# Patient Record
Sex: FEMALE | Race: WHITE | NOT HISPANIC OR LATINO | Employment: FULL TIME | URBAN - METROPOLITAN AREA
[De-identification: names, ages, dates, MRNs, and addresses within clinical notes are randomized per-mention and may not be internally consistent; named-entity substitution may affect disease eponyms.]

---

## 2024-11-15 ENCOUNTER — TELEPHONE (OUTPATIENT)
Age: 39
End: 2024-11-15

## 2024-11-15 NOTE — TELEPHONE ENCOUNTER
Spoke to pt, has no concerns other then her age and this is her 1st pregnancy. Advice pt to wait for her schedule apt and if she has any issues prior to her apt then she can give us a call back.

## 2024-11-15 NOTE — TELEPHONE ENCOUNTER
Pt called, requesting a call from provider if she will need to be seen sooner due to her age and first time pregnancy.     Pt is requesting a call from ofc to help her decide on insurance benefits for her OB visits. Advised to pt to reach out to insurance, pt stated that she will, but still requests a call from ofc.

## 2024-11-21 ENCOUNTER — TELEPHONE (OUTPATIENT)
Age: 39
End: 2024-11-21

## 2024-11-21 DIAGNOSIS — E07.9 THYROID DISEASE DURING PREGNANCY IN FIRST TRIMESTER: Primary | ICD-10-CM

## 2024-11-21 DIAGNOSIS — O99.281 THYROID DISEASE DURING PREGNANCY IN FIRST TRIMESTER: Primary | ICD-10-CM

## 2024-11-21 NOTE — TELEPHONE ENCOUNTER
New OB patient, LMP 10/11/24 (5w6d) calling with concerns for thyroid levels being off. Had previous levels prior to pregnancy checked by old PCP in March 2024 which returned at - TSH 5.17 and T4 1.2. Was supposed to return and obtain repeat levels within 3 months however, patient had not obtained those labs due to having bought a house and relocated. Now patient noted that the last 3 weeks, she has had significant increase in hair loss and cold intolerance. Patient's immediate family members (parents, siblings) all have thyroid disease which is managed by medication and she believes her symptoms may be related to her thyroid. She is concerned her thyroid dysfunction will affect pregnancy. Asking if possible to check levels prior to D&V (scheduled 12/10/24) for further evaluation. Pt plans to establish care with new PCP within the [facility] network as well, and aware will follow up with PCP for thyroid management after delivery. RN advised will send message to provider for further recommendations. Pt agreeable to plan. Aware staff will return a call.   
RN placed call to patient with update on labs. Advised can go to any [facility]'s lab to obtain lab work. Once results are in, staff will have provider review, and call with recommendations. Pt also asking about caffeine intake. RN advised can still have caffeine but with moderation. Advised 1 cup of coffee OR soda per day is safe in pregnancy. Pt verbalized an understanding. No further questions.   
Yes can check TSH and fT4 at this time  
- - -

## 2024-11-22 ENCOUNTER — APPOINTMENT (OUTPATIENT)
Dept: LAB | Facility: CLINIC | Age: 39
End: 2024-11-22
Payer: COMMERCIAL

## 2024-11-22 DIAGNOSIS — O99.281 THYROID DISEASE DURING PREGNANCY IN FIRST TRIMESTER: ICD-10-CM

## 2024-11-22 DIAGNOSIS — E07.9 THYROID DISEASE DURING PREGNANCY IN FIRST TRIMESTER: ICD-10-CM

## 2024-11-22 LAB
T4 FREE SERPL-MCNC: 0.84 NG/DL (ref 0.61–1.12)
TSH SERPL DL<=0.05 MIU/L-ACNC: 6.1 UIU/ML (ref 0.45–4.5)

## 2024-11-22 PROCEDURE — 84443 ASSAY THYROID STIM HORMONE: CPT

## 2024-11-22 PROCEDURE — 84439 ASSAY OF FREE THYROXINE: CPT

## 2024-11-22 PROCEDURE — 36415 COLL VENOUS BLD VENIPUNCTURE: CPT

## 2024-11-23 ENCOUNTER — NURSE TRIAGE (OUTPATIENT)
Dept: OTHER | Facility: OTHER | Age: 39
End: 2024-11-23

## 2024-11-23 DIAGNOSIS — R79.89 ELEVATED TSH: Primary | ICD-10-CM

## 2024-11-23 PROBLEM — E03.8 SUBCLINICAL HYPOTHYROIDISM: Status: ACTIVE | Noted: 2024-11-23

## 2024-11-23 NOTE — TELEPHONE ENCOUNTER
"Regarding: Thyroid labs  ----- Message from Teresa GARCIA sent at 11/23/2024  2:26 PM EST -----  Pt stated, \" I spoke with someone earlier about my labs for my Thyroid levels being high. The doctor was supposed to call me back but I never received a call. \"    "

## 2024-11-23 NOTE — TELEPHONE ENCOUNTER
Reason for Disposition   [1] Follow-up call from patient regarding patient's clinical status AND [2] information urgent    Protocols used: PCP Call - No Triage-Adult-      ESC message sent to on call. Provider stated she tried calling patient earlier. She will try to call her again now.

## 2024-11-23 NOTE — TELEPHONE ENCOUNTER
"Regarding: bloodwork/thyroid  ----- Message from Lisa WHALEN sent at 11/23/2024 12:24 PM EST -----  \"I went for bloodwork and it came back really high for my throid. I wanted to know if I had to wait until Monday or can they put me on medication now\"    "

## 2024-11-23 NOTE — TELEPHONE ENCOUNTER
"Spoke with Dr. Nichelle Gomez, she will call patient directly. Patient made aware and verbalized understanding. Advised to call back if does not hear from MD.       Reason for Disposition   Prescription request for new medicine (not a refill)    Answer Assessment - Initial Assessment Questions  1. NAME of MEDICINE: \"What medicine(s) are you calling about?\"      Medication for thyroid  2. QUESTION: \"What is your question?\" (e.g., double dose of medicine, side effect)      \"Had blood work done for thyroid, results came back at 6.1. Concerned if be starting medication now because of pregnancy?\"  5. PREGNANCY:  \"Is there any chance that you are pregnant?\" \"When was your last menstrual period?\"      LMP 10.11.24 . Tested positive on 11.11.24. Has not yet been seen by OB. First appointment scheduled for 12.10.24. However, did have thyroid blood work ordered and done through us. Strong family history.    Protocols used: Medication Question Call-Adult-    "

## 2024-11-23 NOTE — PROGRESS NOTES
Patient called due to elevated TSH. Labs reviewed- TSH elevated to 6.1 with normal T4 of 0.84. Patient had pos UPT 11/11, LMP 10/11, 6w1d by dates. Reports fam hx of thyroid disease. Had labs drawn prior to pregnancy and had TSH of 5 with T4 1.8. Plan had been to repeat in 3-4 months but patient was lost to follow up with that provider. Called OB to complete testing when she realized she was pregnant.     Discussed with patient that normal T4 is reassuring. Discussed with patient thyroid replacement could be beneficial if she is found to have thyroid antibodies- otherwise treatment would only be indicated for overt hypothyroidism, which she does not have given normal T4. TSI and TPO abs ordered. If normal, plan to repeat labs in 4-6 weeks to monitor

## 2024-11-25 ENCOUNTER — APPOINTMENT (OUTPATIENT)
Dept: LAB | Facility: CLINIC | Age: 39
End: 2024-11-25
Payer: COMMERCIAL

## 2024-11-25 DIAGNOSIS — R79.89 ELEVATED TSH: ICD-10-CM

## 2024-11-25 PROCEDURE — 36415 COLL VENOUS BLD VENIPUNCTURE: CPT

## 2024-11-25 PROCEDURE — 84445 ASSAY OF TSI GLOBULIN: CPT

## 2024-11-25 PROCEDURE — 86376 MICROSOMAL ANTIBODY EACH: CPT

## 2024-11-26 ENCOUNTER — TELEPHONE (OUTPATIENT)
Age: 39
End: 2024-11-26

## 2024-11-26 NOTE — TELEPHONE ENCOUNTER
Patient calling in stating that she's pregnant LMP is 10/11/24. Pt has a New Pt appt on 12/10/24 for D&V. Pt is reporting spotting that is light pink a week ago and when wiping, and within the last couple of days the bleeding is brown in color when wiping sporadic. Pt is now having cramping 2-3/10, and the vaginal bleeding is darker brown in color.     Pt was also calling in concerned for her labs that she had completed, as she would like further recommendations.     Epic Secure Chat sent to Dr Gibbs- on call provider.  Epic Secure Chat received: checking her labs now  her tsh is quite high though that can sometimes happen in early pregnancy, the other labwork is in process. Dr. Gomez already talked to her about her tsh, the additional bloodwork is not resulted yet. brown discharge is older blood, rest and hydrate as needed can try some tylenol or at this early stage motrin is also safe to ease cramping and calm uterus   that is fine, not necessary since bleeding is brown, can use heat pad to back but not to abdomen     Pt was notified of Dr Tonny hurtado, pt verbalized understanding.     Pt stating that she has a family history of thyriod problems, pt stating that her TSH was 5.1 in April.     Epic Secure Chat received: yes this was reviewed with her by Dr. Gomez, however early pregnancy hormones often also alter tsh which is why we are waiting for her additional thyroid labs to determine if she needs treatment immediately or after we repeat in 4-6 weeks to evaluate where her thyroid function settles into for the pregnancy. This is because her free t4 (actual thyroid hormone available to her body) is in the normal range. otherwise sometimes we start treating in early pregnancy and have to readjust in the other direction again     Pt was notified of Dr Tonny hurtado, pt verbalized understanding.

## 2024-11-27 ENCOUNTER — TELEPHONE (OUTPATIENT)
Age: 39
End: 2024-11-27

## 2024-11-27 LAB — THYROPEROXIDASE AB SERPL-ACNC: <9 IU/ML (ref 0–34)

## 2024-11-27 NOTE — TELEPHONE ENCOUNTER
Pt calling to follow up on thyroid labs. RN advised labs are still in process. Advised once returned provider will review and staff will return a call. Results may pop up via portal over the holiday/weekend, however, provider may not see it until Monday.    Pt states continues with brown vaginal spotting with wiping, however, only when active/walking. States at night there is no spotting. Notes intermittent mild cramping that she does not feel requires OTC pain medication. RN advised brown discharge in pregnancy, especially spotting with wiping, is common in early pregnancy. Advised so long as does not become bright red, passing clots, and/or becomes a heavier flow - continue to monitor. Also advised so long as cramping is intermittent and mild, is common in early pregnancy with uterus getting ready to support embryo/fetus. Call back if cramping becomes severe/persistent.     Pt verbalized an understanding. No further questions at this time.

## 2024-11-27 NOTE — TELEPHONE ENCOUNTER
Pt called back stating she saw her antibody test results and would like to review asap. Advised will send message to MD for review. Pt thankful.

## 2024-11-29 ENCOUNTER — RESULTS FOLLOW-UP (OUTPATIENT)
Dept: LABOR AND DELIVERY | Facility: HOSPITAL | Age: 39
End: 2024-11-29

## 2024-12-01 LAB — TSI SER-ACNC: <0.1 IU/L (ref 0–0.55)

## 2024-12-02 ENCOUNTER — RESULTS FOLLOW-UP (OUTPATIENT)
Dept: OBGYN CLINIC | Facility: CLINIC | Age: 39
End: 2024-12-02

## 2024-12-02 DIAGNOSIS — E03.8 SUBCLINICAL HYPOTHYROIDISM: Primary | ICD-10-CM

## 2024-12-02 DIAGNOSIS — O46.90 VAGINAL BLEEDING IN PREGNANCY: ICD-10-CM

## 2024-12-02 RX ORDER — LEVOTHYROXINE SODIUM 25 UG/1
25 TABLET ORAL DAILY
Qty: 30 TABLET | Refills: 1 | Status: SHIPPED | OUTPATIENT
Start: 2024-12-02

## 2024-12-02 NOTE — TELEPHONE ENCOUNTER
7w3d new OB patient reports ongoing brown vaginal spotting for past 2 weeks. States it started out pink, then light brown, now dark brown. States spotting has continued with wiping and at times will be in underwear appearing as discharge. States she was also having mild dull 2/10 abdominal cramping, but cramping resolved since yesterday. Denies vaginitis, intercourse, straining since spotting began. Advised to continue pelvic rest for now and call back if bleeding becomes bright red or heavy like a period. Patient verbalized understanding.  ESC sent to Dr. Gibbs for advice - awaiting response.     Patient also states she received conflicting information regarding TSH levels and management. Patient states Dr. Gomez advised monitoring levels for now, whereas Rosa Maria advised starting medication. See 11/23 note from Dr. Gomez for reference. Patient is agreeable to medication and feels it would help with her symptoms of hair loss, feeling cold, aching joints. States she feels thyroid levels are hereditary. However, before beginning medication, she is requesting clarification from provider. Patient also looking for review of TSI results. Routing to Rosa Maria for clarification.

## 2024-12-02 NOTE — TELEPHONE ENCOUNTER
Patient called in, confirms she would like to do the transvaginal US with radiology. Ordered US and provided patient with contact info to schedule with central scheduling.

## 2024-12-02 NOTE — TELEPHONE ENCOUNTER
"See response from Dr. Gibbs: \"would offer radiology scan to check on pregnancy if desired. would keep D&V also. spotting can be very common in early pregnancy but can come from many different causes so it is hard to know how long her spotting will last.\" Dr. Gibbs also states ok for CTS to place order for complete transvaginal ultrasound with her cosign if patient agreeable.    Call made to patient and reviewed provider advice. Patient verbalized understanding and states she will think on it and discuss with insurance regarding coverage. Patient will call back if she plans on obtaining TVUS.   "

## 2024-12-04 ENCOUNTER — TELEPHONE (OUTPATIENT)
Dept: LABOR AND DELIVERY | Facility: HOSPITAL | Age: 39
End: 2024-12-04

## 2024-12-05 NOTE — PROGRESS NOTES
Assessment & Plan   Diagnoses and all orders for this visit:    Amenorrhea  -     AMB US OB < 14 weeks single or first gestation level 1    8 weeks gestation of pregnancy  -     Ambulatory Referral to Maternal Fetal Medicine; Future    Discussion  Viable IUP at  8w4d - CLAUDE established to 25 based on LMP; Currently not spotting and reassuring ultrasound today. Continue to keep an eye - ok to lift pelvic rest, but if bleeding recurs then maintain pelvic rest until checked out by PNC; patient to call or return with any concerning symptoms  Encouraged the flu vaccine and COVID booster in pregnancy; Encouraged infection prevention/handwashing. Considering flu vac - declines receiving today - will continue to think about; Declines COVID booster  Continue synthroid 25 mcg daily; OB nurse to order repeat TSH and fT4 with initial ob labs; Patient aware to complete 6 weeks from when she started the synthroid  Referral placed for MFM to schedule early anatomy scan and review options for genetic screening  RTO in 2 weeks for OB INTAKE with OB nurse navigator  RTO in 4 weeks for INITIAL PRENATAL - routine ob check with physical exam or sooner if needed    Subjective     HPI  39 y.o.  who presents with amenorrhea. LMP is an exact date of 10/11/24. This makes her 8w4d corresponding to an CLAUDE of 25.   Periods are usually reg q month (28-32 days). Stopped seasonique around May 2024; started trying in September;   This is a planned and welcomed pregnancy.  24 - TSH elevated at 6.104; fT4 normal at 0.84; 24 - Thyroid stimulating immunoglobulin and anti-microsomal antibody both negative; patient experiencing cold intolerance and hair loss and opted to treat subclinical hyothyroid - started on synthroid 25 mcg daily;  (+) n - comes and goes; (+) cramping - mild menstrual like (3/10); (+) vb - some light pink spotting that started about 2 days after pos UPT - turned brown - daily for about 2 weeks and stopped  last week; restarted after a couple days but hasn't had since this past Saturday; Denies v/HA/lof/edema/dv/smoking; urine neg/neg  PNVs + DHA - tolerating daily; r/shai 1 mg folic acid and DHA daily    TV us done - single viable IUP measuring 21.5 mm by CRL at 8w5d; (+) FHM of 179 bpm;  Assigning a Final CLAUDE  Please choose how you are assigning the CLAUDE: The gestational age by LMP is </= 8w 6d and demonstrates 5 or fewer days difference from the gestational age by CRL, therefore the final CLAUDE will be based on the LMP    Final CLAUDE: 25 by LMP.    Ultrasound Probe Disinfection    A transvaginal ultrasound was performed.   Probe identification: probe serial number [facility]: [id number]  Disinfection process: Disinfection was performed with High Level Disinfection Process (Trophon)    Mehul Stauffer PA-C  12/10/24  10:17 AM    OB History    Para Term  AB Living   1        SAB IAB Ectopic Multiple Live Births             # Outcome Date GA Lbr Isaac/2nd Weight Sex Type Anes PTL Lv   1 Current                No past medical history on file.      Current Outpatient Medications:     levothyroxine (Synthroid) 25 mcg tablet, Take 1 tablet (25 mcg total) by mouth daily, Disp: 30 tablet, Rfl: 1    Prenat w/o A-FeCbGl-DSS-FA-DHA (PNV OB+DHA PO), Take by mouth, Disp: , Rfl:     No Known Allergies    Objective   Vitals:    12/10/24 0800   BP: 120/80   BP Location: Right arm   Cuff Size: Standard   Weight: 76.2 kg (168 lb)     Physical Exam  Vitals reviewed.   Constitutional:       General: She is not in acute distress.     Appearance: Normal appearance. She is not ill-appearing, toxic-appearing or diaphoretic.   HENT:      Head: Normocephalic and atraumatic.   Eyes:      Conjunctiva/sclera: Conjunctivae normal.   Neurological:      Mental Status: She is alert and oriented to person, place, and time.   Psychiatric:         Mood and Affect: Mood normal.         Behavior: Behavior normal.         Thought Content:  Thought content normal.         Judgment: Judgment normal.

## 2024-12-05 NOTE — TELEPHONE ENCOUNTER
Discussed with patient recommendations. Reassurance provided.   Based on LEXI recs, decision making for treating subclincal hypothyroidism is based on antibody testing.    Also discussed with patient other associations have different recommendations and base treatment on threshold levels of TSH elevation.     Reassured patient that overall both subclinical hypothyroid as well as treatment with synthroid are both low risk for adverse pregnancy outcomes.    After discussing with patient and Rosa Maria, will treat with synthroid as patient is feeling symptomatic with cold intolerance and hair loss and feels reassured by starting treatment

## 2024-12-10 ENCOUNTER — ULTRASOUND (OUTPATIENT)
Dept: OBGYN CLINIC | Facility: CLINIC | Age: 39
End: 2024-12-10
Payer: COMMERCIAL

## 2024-12-10 VITALS — SYSTOLIC BLOOD PRESSURE: 120 MMHG | DIASTOLIC BLOOD PRESSURE: 80 MMHG | WEIGHT: 168 LBS

## 2024-12-10 DIAGNOSIS — N91.2 AMENORRHEA: Primary | ICD-10-CM

## 2024-12-10 DIAGNOSIS — Z3A.08 8 WEEKS GESTATION OF PREGNANCY: ICD-10-CM

## 2024-12-10 PROCEDURE — 76817 TRANSVAGINAL US OBSTETRIC: CPT | Performed by: PHYSICIAN ASSISTANT

## 2024-12-10 PROCEDURE — 99204 OFFICE O/P NEW MOD 45 MIN: CPT | Performed by: PHYSICIAN ASSISTANT

## 2024-12-30 ENCOUNTER — INITIAL PRENATAL (OUTPATIENT)
Dept: OBGYN CLINIC | Facility: CLINIC | Age: 39
End: 2024-12-30

## 2024-12-30 VITALS
WEIGHT: 170.6 LBS | DIASTOLIC BLOOD PRESSURE: 60 MMHG | BODY MASS INDEX: 26.78 KG/M2 | HEIGHT: 67 IN | SYSTOLIC BLOOD PRESSURE: 108 MMHG

## 2024-12-30 DIAGNOSIS — Z34.01 ENCOUNTER FOR SUPERVISION OF NORMAL FIRST PREGNANCY IN FIRST TRIMESTER: Primary | ICD-10-CM

## 2024-12-30 DIAGNOSIS — Z31.430 ENCOUNTER OF FEMALE FOR TESTING FOR GENETIC DISEASE CARRIER STATUS FOR PROCREATIVE MANAGEMENT: ICD-10-CM

## 2024-12-30 DIAGNOSIS — E03.8 SUBCLINICAL HYPOTHYROIDISM: ICD-10-CM

## 2024-12-30 PROCEDURE — OBC: Performed by: PHYSICIAN ASSISTANT

## 2024-12-30 NOTE — PATIENT INSTRUCTIONS
Congratulations!! Please review our Pregnancy Essential Guide and [facility] L&D Virtual tour from our networks website.     [facility] Pregnancy Essentials Guide  [facility] ([email or web address])     [facility] ([employer])

## 2024-12-30 NOTE — PROGRESS NOTES
Details that I feel the provider should be aware of:   - subclinical hypothyroidism, 25mcg levothyroxine initatied 12/3/24. Repeat TFTs ordered with prenatal labs. Pt reports prior c/o hair loss improved w/starting medication. Has now noticed return of hair loss in the last few days.  - last pap approx  (medical records request completed at checkout to obtain records)  - VB/spotting early pregnancy, pt confirms no further episodes noted since 24  - NT ultrasound scheduled w/MFM 25 and next ob visit in office 25  - AMA    OB INTAKE INTERVIEW  Patient is 39 y.o. who presents for OB intake at 11w3d  She is accompanied by her spouse, Tim during this encounter  The father of her baby (Tim) is involved in the pregnancy.      Last Menstrual Period: 10/11/24  Ultrasound: Measured 8 weeks 5 days on 12/10/24  Estimated Date of Delivery: 25 by LMP confirmed by 8 week US    Signs/Symptoms of Pregnancy  Current pregnancy symptoms: hair loss (d/t abn thyroid studies - improved w/initiating medication but has noticed some hair loss last few days again), breast tenderness  Constipation no  Headaches no  Cramping/spotting no  PICA cravings no    Diabetes- BMI 26.72  If patient has 1 or more, please order early 1 hour GTT  History of GDM no  BMI >35 no  History of PCOS or current metformin use no  History of LGA/macrosomic infant (4000g/9lbs) no    If patient has 2 or more, please order early 1 hour GTT  BMI>30 no  AMA YES  First degree relative with type 2 diabetes no  History of chronic HTN, hyperlipidemia, elevated A1C no  High risk race (, , ,  or ) no    Hypertension- if you answer yes to any of the following, please order baseline preeclampsia labs (cbc, comprehensive metabolic panel, urine protein creatinine ratio, uric acid)  History of of chronic HTN no  History of gestational HTN no  History of preeclampsia, eclampsia, or HELLP  syndrome no  History of diabetes no  History of lupus,sjogrens syndrome, kidney disease no    Thyroid- if yes order TSH with reflex T4  History of thyroid disease YES - recent discovering of subclinical hypothyroidism and initiation of levothyroxine 25mcg daily. Taking as prescribed, started 12/3/24. Repeat TFTs ordered and patient verbalized understanding of completion/timing.    Bleeding Disorder or Hx of DVT-patient or first degree relative with history of. Order the following if not done previously.   (Factor V, antithrombin III, prothrombin gene mutation, protein C and S Ag, lupus anticoagulant, anticardiolipin, beta-2 glycoprotein)   no    OB/GYN-  History of abnormal pap smear no       Date of last pap smear approx , denies hx abn pap. Medical records request completed at check out to request prior GYN records.  History of HPV no  History of Herpes/HSV no  History of other STI (gonorrhea, chlamydia, trich) no  History of prior  no  History of prior  no  History of  delivery prior to 36 weeks 6 days no  History of Varicella or Vaccination hx chicken pox  History of blood transfusion no  Ok for blood transfusion yes    Substance screening-   History of tobacco use YES - quit   Currently using tobacco no  Substance Use Screen Level (N/A, LOW, HIGH) n/a    MRSA Screening-   Does the pt have a hx of MRSA? no    Immunizations:  Influenza vaccine given this season reviewed  Discussed Tdap vaccine yes  Discussed COVID Vaccine yes    Genetic/[facility]-  Do you or your partner have a history of any of the following in yourselves or first degree relatives?  Cystic fibrosis no  Spinal muscular atrophy no  Hemoglobinopathy/Sickle Cell/Thalassemia no  Fragile X Intellectual Disability no      Reviewed, ordered and pt aware of prior auth process. Plans to complete at [facility]    Appointment for Nuchal Translucency Ultrasound at [facility] scheduled for 25      Interview education  [facility] Pregnancy  Essentials Book reviewed, discussed and attached to their AVS yes    Nurse/Family Partnership- patient may qualify no; referral placed no    Prenatal lab work scripts yes  Extra labs ordered:  CF, SMA, hgb fractionation cascade, TFTs    Aspirin/Preeclampsia Screen    Risk Level Risk Factor Recommendation   LOW Prior Uncomplicated full-term delivery no No Aspirin recommendation        MODERATE Nulliparity YES Recommend low-dose aspirin if     BMI>30 no 2 or more moderate risk factors    Family History Preeclampsia (mother/sister) no     35yr old or greater YES     Black Race, Concern for SDOH/Low Socioeconomic no     IVF Pregnancy  no     Personal History Risks (low birth weight, prior adverse preg outcome, >10yr preg interval) no         HIGH History of Preeclampsia no Recommend low-dose aspirin if     Multifetal gestation no 1 or more high risk factors    Chronic HTN no     Type 1 or 2 Diabetes no     Renal Disease no     Autoimmune Disease  no      Contraindications to ASA therapy:  NSAID/ ASA allergy: no  Nasal polyps: no  Asthma with history of ASA induced bronchospasm: no  Relative contraindications:  History of GI bleed: no  Active peptic ulcer disease: no  Severe hepatic dysfunction: no    Patient should be recommended to take ASA 162mg during this pregnancy from 12-36wks to lower her risk of preeclampsia: reviewed risk factors and ASA therapy recommendations. Patient verbalized understanding, had no questions at this time, and will further review with MFM at upcoming scheduled appointment.          The patient has a history now or in prior pregnancy notable for:  Subclinical hypothyroidism, AMA        PN1 visit scheduled. The patient was oriented to our practice, the navigator role, reviewed delivering physicians and [facility] for Delivery. All questions were answered.    Interviewed by: No SAMPSON RN, BSN

## 2025-01-04 LAB
ABO GROUP BLD: NORMAL
APPEARANCE UR: CLEAR
BACTERIA UR CULT: NORMAL
BACTERIA URNS QL MICRO: NORMAL
BASOPHILS # BLD AUTO: 0 X10E3/UL (ref 0–0.2)
BASOPHILS NFR BLD AUTO: 0 %
BILIRUB UR QL STRIP: NEGATIVE
BLD GP AB SCN SERPL QL: NEGATIVE
CASTS URNS QL MICRO: NORMAL /LPF
COLOR UR: YELLOW
EOSINOPHIL # BLD AUTO: 0.1 X10E3/UL (ref 0–0.4)
EOSINOPHIL NFR BLD AUTO: 1 %
EPI CELLS #/AREA URNS HPF: NORMAL /HPF (ref 0–10)
ERYTHROCYTE [DISTWIDTH] IN BLOOD BY AUTOMATED COUNT: 11.8 % (ref 11.7–15.4)
GLUCOSE UR QL: NEGATIVE
HBV SURFACE AG SERPL QL IA: NEGATIVE
HCT VFR BLD AUTO: 37.7 % (ref 34–46.6)
HCV AB S/CO SERPL IA: NON REACTIVE
HGB A MFR BLD: 2.4 % (ref 1.8–3.2)
HGB A MFR BLD: 97.6 % (ref 96.4–98.8)
HGB BLD-MCNC: 12.3 G/DL (ref 11.1–15.9)
HGB F MFR BLD: 0 % (ref 0–2)
HGB FRACT BLD-IMP: NORMAL
HGB S MFR BLD: 0 %
HGB UR QL STRIP: NEGATIVE
HIV 1+2 AB+HIV1 P24 AG SERPL QL IA: NON REACTIVE
IMM GRANULOCYTES # BLD: 0.1 X10E3/UL (ref 0–0.1)
IMM GRANULOCYTES NFR BLD: 1 %
KETONES UR QL STRIP: NEGATIVE
LEUKOCYTE ESTERASE UR QL STRIP: NEGATIVE
LYMPHOCYTES # BLD AUTO: 1.7 X10E3/UL (ref 0.7–3.1)
LYMPHOCYTES NFR BLD AUTO: 18 %
Lab: NO GROWTH
MCH RBC QN AUTO: 30 PG (ref 26.6–33)
MCHC RBC AUTO-ENTMCNC: 32.6 G/DL (ref 31.5–35.7)
MCV RBC AUTO: 92 FL (ref 79–97)
MICRO URNS: ABNORMAL
MICRO URNS: ABNORMAL
MONOCYTES # BLD AUTO: 0.5 X10E3/UL (ref 0.1–0.9)
MONOCYTES NFR BLD AUTO: 5 %
NEUTROPHILS # BLD AUTO: 6.8 X10E3/UL (ref 1.4–7)
NEUTROPHILS NFR BLD AUTO: 75 %
NITRITE UR QL STRIP: NEGATIVE
PH UR STRIP: 6.5 [PH] (ref 5–7.5)
PLATELET # BLD AUTO: 228 X10E3/UL (ref 150–450)
PROT UR QL STRIP: NEGATIVE
RBC # BLD AUTO: 4.1 X10E6/UL (ref 3.77–5.28)
RBC #/AREA URNS HPF: NORMAL /HPF (ref 0–2)
RH BLD: POSITIVE
RUBV IGG SERPL IA-ACNC: 7.82 INDEX
SL AMB TREPONEMA PALLIDUM ANTIBODIES: NON REACTIVE
SP GR UR: <=1.005 (ref 1–1.03)
TSH SERPL DL<=0.005 MIU/L-ACNC: 1.92 UIU/ML (ref 0.45–4.5)
UROBILINOGEN UR STRIP-ACNC: 0.2 MG/DL (ref 0.2–1)
WBC # BLD AUTO: 9 X10E3/UL (ref 3.4–10.8)
WBC #/AREA URNS HPF: NORMAL /HPF (ref 0–5)

## 2025-01-06 ENCOUNTER — RESULTS FOLLOW-UP (OUTPATIENT)
Dept: OBGYN CLINIC | Facility: CLINIC | Age: 40
End: 2025-01-06

## 2025-01-06 NOTE — PROGRESS NOTES
OB/GYN  PN Visit  No Daigle  [id number]  1/10/2025  12:44 PM  Rosa Maria Parker PA-C    S: 39 y.o.  13w0d here for PN visit. Pregnancy complicated by AMA, subclinical hypothyroidism.     OB Complaints:  Denies c/o n/v/ha, no edema, no smoking, no DV.   No vb/lof  No cramping/ctxns or signs of PTL.    She reports that she is feeling well. Denies any pregnancy related concerns.   Established care with MFM. NIPS pending.     O:    Pre-Marcellus Vitals    Flowsheet Row Most Recent Value   Prenatal Assessment    Fetal Heart Rate 154   Fundal Height (cm) 13 cm   Prenatal Vitals    Blood Pressure 110/74   Weight - Scale 78.9 kg (174 lb)   Urine Albumin/Glucose    Dilation/Effacement/Station    Vaginal Drainage    Draining Fluid No   Edema    LLE Edema None   RLE Edema None   Facial Edema None            Gen: no acute distress, nonlabored breathing.  OB exam completed: fundal height, +FHT  Urine: -/-    A/P:  #1. 13w0d GESTATION  Physical exam and cultures done today. Last pap: . Pap not done  today per ASCCP guidelines. We are awaiting results form her previous OB/GYN, records have been requested.   AFP reviewed. She will consider and we will plan to order at her next visit if she is interested in pursuing it.       RTC in 4 weeks

## 2025-01-07 ENCOUNTER — ROUTINE PRENATAL (OUTPATIENT)
Facility: HOSPITAL | Age: 40
End: 2025-01-07
Payer: COMMERCIAL

## 2025-01-07 VITALS
WEIGHT: 172.62 LBS | DIASTOLIC BLOOD PRESSURE: 80 MMHG | HEIGHT: 67 IN | SYSTOLIC BLOOD PRESSURE: 128 MMHG | BODY MASS INDEX: 27.09 KG/M2 | HEART RATE: 90 BPM | OXYGEN SATURATION: 98 %

## 2025-01-07 DIAGNOSIS — O99.281 HYPOTHYROIDISM DURING PREGNANCY IN FIRST TRIMESTER: Primary | ICD-10-CM

## 2025-01-07 DIAGNOSIS — Z33.1 PREGNANT STATE, INCIDENTAL: ICD-10-CM

## 2025-01-07 DIAGNOSIS — E03.9 HYPOTHYROIDISM DURING PREGNANCY IN FIRST TRIMESTER: Primary | ICD-10-CM

## 2025-01-07 DIAGNOSIS — Z3A.12 12 WEEKS GESTATION OF PREGNANCY: ICD-10-CM

## 2025-01-07 DIAGNOSIS — Z91.89 AT RISK FOR HYPERTENSION: ICD-10-CM

## 2025-01-07 DIAGNOSIS — Z36.82 ENCOUNTER FOR NUCHAL TRANSLUCENCY TESTING: ICD-10-CM

## 2025-01-07 DIAGNOSIS — O09.511 PRIMIGRAVIDA OF ADVANCED MATERNAL AGE IN FIRST TRIMESTER: ICD-10-CM

## 2025-01-07 PROCEDURE — 76813 OB US NUCHAL MEAS 1 GEST: CPT | Performed by: OBSTETRICS & GYNECOLOGY

## 2025-01-07 PROCEDURE — 76801 OB US < 14 WKS SINGLE FETUS: CPT | Performed by: OBSTETRICS & GYNECOLOGY

## 2025-01-07 PROCEDURE — 99243 OFF/OP CNSLTJ NEW/EST LOW 30: CPT | Performed by: OBSTETRICS & GYNECOLOGY

## 2025-01-07 PROCEDURE — 36415 COLL VENOUS BLD VENIPUNCTURE: CPT | Performed by: OBSTETRICS & GYNECOLOGY

## 2025-01-07 RX ORDER — ASPIRIN 81 MG/1
162 TABLET ORAL DAILY
Qty: 90 TABLET | Refills: 2 | Status: SHIPPED | OUTPATIENT
Start: 2025-01-07

## 2025-01-07 NOTE — PROGRESS NOTES
No presents today for a genetic screening ultrasound.  This is her first pregnancy.  She has a history of hypothyroidism currently on 25 mcg of levothyroxine with recent TSH level of 1.7.  She has no other significant contributory history.  Her sister is a known carrier for cystic fibrosis and No has her blood work pending.    We discussed the options for genetic screening, including but not limited to first trimester screening, second trimester screening, combined first and second trimester screening, noninvasive prenatal screening (NIPS) for patients at high risk and diagnostic screening through the use of CVS and amniocentesis. We discussed the risks and benefits of each approach including the sensitivities and false positive rates as well as the difference between a screening test and a diagnostic test. At the conclusion of our discussion the patient elected noninvasive prenatal screening utilizing the MaterniT 21 plus test. The patient had this blood work drawn in the office.   The results should be available in approximately 7-10 days.    Given the patient's history of nulliparity and maternal age, I recommend initiating low dose aspirin therapy.  A recent meta-analysis yielded risk reductions of 24% for preeclampsia, 20% for intrauterine growth restriction, and 14% for  birth, with an absolute risk reduction of 2-5% for preeclampsia, one to 5% for intrauterine growth restriction, and 2-4% for  birth.  In this study, there was no identified risk of harm to the mother or fetus but long-term evidence was somewhat limited.  Given the overall safety profile and risk-benefit analysis, I recommend 162 mg of aspirin be taken Daily and discontinued at around 36 weeks gestation or 2-3 weeks prior to planned delivery.   I reviewed these recommendations with the patient and answered all of her questions to apparent satisfaction.    The patient and I discussed her current state of hypothyroidism  "with a normal TSH level. I recommend repeating her TSH within the next 6 weeks and adjusting her Synthroid dose to a TSH level with the trimester specific goal in (mU/ML) as follows: First trimester 0.1 to 2.5, Second trimester 0.2 to 3.0, Third trimester 0.3 to 3.0.. I also recommend drawing a TSH level every 4-6 weeks and adjusting her Synthroid dose accordingly as pregnancy can significantly increase your requirements for thyroid hormones especially during the second trimester. As long as the patient's hypothyroidism is well controlled, she should not have any significant increased risk of adverse pregnancy outcome. Poorly controlled maternal hypothyroidism does place the pregnancy at risk for preeclampsia, placental abruption, nonreassuring fetal heart rate,  birth, low birthweight,  morbidity and mortality, neuropsychological and cognitive impairment, and postpartum hemorrhage. We discussed these risks and the importance of medication adherence to achieve euthyroidism. As long as the patient's hypothyroidism is well controlled and no issues arise during the pregnancy, no deviation from normal labor and delivery is required.    We discussed follow-up in detail and I recommend an anatomy ultrasound be scheduled for 20 weeks gestation.    Thank you very much for allowing us to participate in the care of this very nice patient. Should you have any questions, please do not hesitate to contact me.    Portions of the record may have been created with voice recognition software. Occasional wrong word or \"sound a like\" substitutions may have occurred due to the inherent limitations of voice recognition software. Read the chart carefully and recognize, using context, where substitutions have occurred.  "

## 2025-01-07 NOTE — PROGRESS NOTES
Patient chose to have [facility] [id number] Non-Invasive Prenatal Screen [id number] PLUS w/ SCA, WITH fetal sex.  Patient choose to be billed through insurance.     Patient given brochure and is aware [facility] will contact patient's insurance and coordinate coverage.  Provided [facility] contact information. General inquiries [phone], Cost estimates [phone] and [facility] Billing [phone]. Website [email or web address].     Blood collection tubes labeled with patient identifiers (name, medical record number, and date of birth).     Filled out [facility] order form. Patient chose to have blood drawn in our office at time of visit. NIPS was drawn from left arm with a butterfly needle by Deidre. .      If patient chose to have blood work drawn at a [facility] lab we requested patient notify MFM (via phone call or poLight message) when blood collected so office can follow up on results.       Maternal Fetal Medicine will have results in approximately 5-7 business days and will call patient or notify via poLight.  Patient aware viewing lab result online will reveal fetal sex if ordered.    Patient verbalized understanding of all instructions and no questions at this time.

## 2025-01-10 ENCOUNTER — INITIAL PRENATAL (OUTPATIENT)
Dept: OBGYN CLINIC | Facility: CLINIC | Age: 40
End: 2025-01-10

## 2025-01-10 VITALS
HEIGHT: 67 IN | WEIGHT: 174 LBS | BODY MASS INDEX: 27.31 KG/M2 | SYSTOLIC BLOOD PRESSURE: 110 MMHG | DIASTOLIC BLOOD PRESSURE: 74 MMHG

## 2025-01-10 DIAGNOSIS — O99.281 HYPOTHYROIDISM DURING PREGNANCY IN FIRST TRIMESTER: ICD-10-CM

## 2025-01-10 DIAGNOSIS — E03.9 HYPOTHYROIDISM DURING PREGNANCY IN FIRST TRIMESTER: ICD-10-CM

## 2025-01-10 DIAGNOSIS — Z3A.13 13 WEEKS GESTATION OF PREGNANCY: Primary | ICD-10-CM

## 2025-01-10 PROCEDURE — PNV: Performed by: PHYSICIAN ASSISTANT

## 2025-01-11 LAB

## 2025-01-13 ENCOUNTER — RESULTS FOLLOW-UP (OUTPATIENT)
Dept: PERINATAL CARE | Facility: OTHER | Age: 40
End: 2025-01-13

## 2025-01-13 LAB
BV BACTERIA RRNA VAG QL NAA+PROBE: NORMAL
C TRACH RRNA SPEC QL NAA+PROBE: NOT DETECTED
CITATION REF LAB TEST: NORMAL
CLINICAL INFO: NORMAL
CLINICAL INFO: NORMAL
COUNSELING NOTE: NORMAL
ETHNIC BACKGROUND STATED: NORMAL
EXTRINS CLOTTING PATH PPP-IMP: NORMAL
GENE DIS ANL CARRIER INTERP-IMP: NORMAL
GENE MUT TESTED BLD/T: NORMAL
GENE MUT TESTED BLD/T: NORMAL
LAB DIRECTOR NAME PROVIDER: NORMAL
LAB DIRECTOR NAME PROVIDER: NORMAL
Lab: NORMAL
MSH6 GENE MUTATIONS FOUND [IDENTIFIER] IN BLOOD OR TISSUE BY MOLECULAR GENETICS METHOD NOMINAL: NORMAL
N GONORRHOEA RRNA SPEC QL NAA+PROBE: NOT DETECTED
PATIENT ETHNICITY: NORMAL
REASON FOR REFERRAL (NARRATIVE): NORMAL
REASON FOR REFERRAL (NARRATIVE): NORMAL
RECOMMENDATION PATIENT DOC-IMP: NORMAL
REF LAB TEST METHOD: NORMAL
REF LAB TEST METHOD: NORMAL
SERVICE CMNT-IMP: NORMAL
SERVICE CMNT-IMP: NORMAL
SMN1 GENE MUT ANL BLD/T: NORMAL
SPECIMEN SOURCE: NORMAL
SPECIMEN SOURCE: NORMAL
SPECIMEN(S) RECEIVED: NORMAL

## 2025-01-13 NOTE — RESULT ENCOUNTER NOTE
I have reviewed the results of the NIPS which are low risk.  Please call patient and notify her of these reassuring results if she has not viewed on MyChart. Please ensure she is notified of recommendation of MSAFP to be ordered and followed up through her primary Obstetrician's office.      Thank you, Lewis Fry MD

## 2025-01-14 ENCOUNTER — RESULTS FOLLOW-UP (OUTPATIENT)
Dept: OBGYN CLINIC | Facility: CLINIC | Age: 40
End: 2025-01-14

## 2025-01-16 ENCOUNTER — TELEPHONE (OUTPATIENT)
Dept: OBGYN CLINIC | Facility: CLINIC | Age: 40
End: 2025-01-16

## 2025-01-16 ENCOUNTER — RESULTS FOLLOW-UP (OUTPATIENT)
Dept: OBGYN CLINIC | Facility: CLINIC | Age: 40
End: 2025-01-16

## 2025-01-16 NOTE — TELEPHONE ENCOUNTER
Lmom for pt    Pt needs chlam/gc. It was to be run off of urine. If not done please call pt and offer her to have it done at lab, or we can collect at her next Ob check, just make note in pink sticky. Thanks!

## 2025-01-17 NOTE — TELEPHONE ENCOUNTER
FYI - Patient returned call, s/w Lay to clarify note & read Rosa Maria's message.   Pt advised the lab results from 1/10 SURESWAB(R) ADVANCED BV/CT/NG, TMA reflects not detected for CT/ NG. It was the BV panel not performed/ run.     Pt states if the test does need repeat, they will do at  next ob appt, prefers not to go to lab.

## 2025-01-24 ENCOUNTER — OFFICE VISIT (OUTPATIENT)
Age: 40
End: 2025-01-24
Payer: COMMERCIAL

## 2025-01-24 VITALS — HEIGHT: 67 IN | BODY MASS INDEX: 27.31 KG/M2 | WEIGHT: 174 LBS

## 2025-01-24 DIAGNOSIS — E03.8 SUBCLINICAL HYPOTHYROIDISM: ICD-10-CM

## 2025-01-24 DIAGNOSIS — L30.9 DERMATITIS OF RIGHT FOOT: Primary | ICD-10-CM

## 2025-01-24 PROCEDURE — 99203 OFFICE O/P NEW LOW 30 MIN: CPT | Performed by: STUDENT IN AN ORGANIZED HEALTH CARE EDUCATION/TRAINING PROGRAM

## 2025-01-24 RX ORDER — LEVOTHYROXINE SODIUM 25 UG/1
25 TABLET ORAL DAILY
Qty: 30 TABLET | Refills: 5 | Status: SHIPPED | OUTPATIENT
Start: 2025-01-24

## 2025-01-24 RX ORDER — HYDROCORTISONE 25 MG/G
CREAM TOPICAL 2 TIMES DAILY
Qty: 28 G | Refills: 0 | Status: SHIPPED | OUTPATIENT
Start: 2025-01-24

## 2025-01-24 NOTE — PROGRESS NOTES
"[facility]iatric Medicine [facility] Surgery Office Visit    ASSESSMENT     Diagnoses and all orders for this visit:    Dermatitis of right foot  -     hydrocortisone 2.5 % cream; Apply topically 2 (two) times a day         Problem List Items Addressed This Visit    None  Visit Diagnoses         Dermatitis of right foot    -  Primary    Relevant Medications    hydrocortisone 2.5 % cream          PLAN  -patient was educated regarding their condition  -Rx hydrocortisone  -We discussed using a moisturizer in between steroid application  -RTC 2-weeks    SUBJECTIVE    Chief Complaint:  Right toe redness     Patient ID: No Sarabia is a pleasant 40 year old female who presents today for swollen toes on her right foot. She states that about a week ago she was wearing a heavier sock and he foot started to get hot and itchy. She denies any injury. This has happened a couple more times throughout the week. It has been itching, burning, hot to the touch and her toes are getting swollen. She does states that she is pregnant and not sure if its related.           The following portions of the patient's history were reviewed and updated as appropriate: allergies, current medications, past family history, past medical history, past social history, past surgical history and problem list.    Review of Systems   Constitutional: Negative.    HENT: Negative.     Respiratory: Negative.     Cardiovascular: Negative.    Gastrointestinal: Negative.    Musculoskeletal: Negative.    Skin:  Positive for rash.   Neurological: Negative.          OBJECTIVE      Ht 5' 7\" (1.702 m)   Wt 78.9 kg (174 lb)   LMP 10/11/2024 (Exact Date)   BMI 27.25 kg/m²        Physical Exam  Constitutional:       Appearance: Normal appearance.   HENT:      Head: Normocephalic and atraumatic.   Eyes:      General:         Right eye: No discharge.         Left eye: No discharge.   Cardiovascular:      Rate and Rhythm: Normal rate and regular rhythm.    "   Pulses:           Dorsalis pedis pulses are 2+ on the right side and 2+ on the left side.        Posterior tibial pulses are 2+ on the right side and 2+ on the left side.   Pulmonary:      Effort: Pulmonary effort is normal.      Breath sounds: Normal breath sounds.   Skin:     General: Skin is warm.      Capillary Refill: Capillary refill takes less than 2 seconds.   Neurological:      Sensory: Sensation is intact. No sensory deficit.         Vascular:  -DP and PT pulses intact b/l  -Capillary refill time <2 sec b/l  -Skin temp: WNL    MSK:  -Pain on palpation negative  -MMT is 5/5 to all muscle compartments of the lower extremity    Neuro:  -Light sensation intact bilaterally  -Protective sensation intact bilaterally    Derm:  -No lesions, abrasions, or open wounds noted  -No noted interdigital maceration, peeling, malodor  -I note xerotic, erythematous, flat, pruritic rash to the patient's right first, second, and partially third digits, consistent with dermatitis

## 2025-02-05 ENCOUNTER — ROUTINE PRENATAL (OUTPATIENT)
Dept: OBGYN CLINIC | Facility: CLINIC | Age: 40
End: 2025-02-05

## 2025-02-05 VITALS — DIASTOLIC BLOOD PRESSURE: 80 MMHG | SYSTOLIC BLOOD PRESSURE: 108 MMHG | BODY MASS INDEX: 28.38 KG/M2 | WEIGHT: 181.2 LBS

## 2025-02-05 DIAGNOSIS — Z36.89 ENCOUNTER FOR OTHER SPECIFIED ANTENATAL SCREENING: ICD-10-CM

## 2025-02-05 DIAGNOSIS — Z3A.17 17 WEEKS GESTATION OF PREGNANCY: Primary | ICD-10-CM

## 2025-02-05 PROCEDURE — PNV: Performed by: NURSE PRACTITIONER

## 2025-02-05 NOTE — PROGRESS NOTES
"  OB/GYN  PN Visit  No Daigle  [id number]  2025  11:54 AM   JUAN Allen    S: No Daigle 40 y.o.  16w5d here for PN visit. She denies leakage of fluid and vaginal bleeding. She is feeling flutter. She denies nausea, vomiting, headache, cramping, edema, domestic violence, and smoking, ETOH or drug. She states a few weeks ago she experienced a weird episode of swelling of her big toe and 2nd toe of her right foot. Seen by foot specialist - took Claritin and applied hydrocortisone and it resolved. The toes were itchy and burning. Her pregnancy is complicated by AMA.     O:    Pre-Marcellus Vitals      Flowsheet Row Most Recent Value   Prenatal Assessment    Fetal Heart Rate 147   Fundal Height (cm) 16 cm   Movement --  [feeling flutters]   Prenatal Vitals    Blood Pressure 108/80   Weight - Scale 82.2 kg (181 lb 3.2 oz)   Urine Albumin/Glucose    Dilation/Effacement/Station    Vaginal Drainage    Edema             Urine: neg/neg    A/P:    1. 16w5d GESTATION  - Continue PNV  - Labs: UTD - AFP ordered  - Genetics: NIPS is normal.   - Ultrasounds: Anatomy scan   - Tdap: Offer @ 28 weeks  - Flu Shot: offered - declined  - COVID: Vaccinated.  - Rhogam: N/A - O positive    - RTO in 4 weeks or sooner if needed    USE THIS FOR OVERVIEW? (Can be edited in the \"Problem list\"  Problem List          Endocrine    Hypothyroidism during pregnancy in first trimester       Obstetrics/Gynecology    12 weeks gestation of pregnancy    Primigravida of advanced maternal age in first trimester       USE THIS FOR \"DAN\" CHARTING (can be edited in the note)  Assessment & Plan  17 weeks gestation of pregnancy         Encounter for other specified  screening    Orders:    Alpha fetoprotein, maternal; Future        Future Appointments   Date Time Provider Department Center   2025  8:00 AM  US 3 [facility] [place]   3/5/2025  7:00 PM [facility] "   3/6/2025  1:30 PM Mima Gibbs MD [facility]   4/4/2025  1:30 PM Maya Akins MD [facility]   5/2/2025  9:00 AM Malgorzata Estrada MD [facility]   5/16/2025  1:45 PM Nichelle Gomez MD [facility]   5/29/2025 11:15 AM Ann Valenzuela MD [facility]   6/11/2025 11:45 AM JUAN Allen [facility]   6/24/2025 11:30 AM JUAN Allen [facility]   7/2/2025 11:45 AM JUAN Allen [facility]   7/10/2025 11:15 AM Ann Valenzuela MD [facility]   7/17/2025 11:15 AM Malgorzata Estrada MD [facility]         JUAN Allen  2/5/2025  11:54 AM

## 2025-02-28 ENCOUNTER — ROUTINE PRENATAL (OUTPATIENT)
Facility: HOSPITAL | Age: 40
End: 2025-02-28
Payer: COMMERCIAL

## 2025-02-28 VITALS
BODY MASS INDEX: 29.54 KG/M2 | WEIGHT: 188.2 LBS | HEIGHT: 67 IN | SYSTOLIC BLOOD PRESSURE: 126 MMHG | DIASTOLIC BLOOD PRESSURE: 86 MMHG | HEART RATE: 108 BPM

## 2025-02-28 DIAGNOSIS — Z36.86 ENCOUNTER FOR ANTENATAL SCREENING FOR CERVICAL LENGTH: ICD-10-CM

## 2025-02-28 DIAGNOSIS — O09.512 PRIMIGRAVIDA OF ADVANCED MATERNAL AGE IN SECOND TRIMESTER: ICD-10-CM

## 2025-02-28 DIAGNOSIS — Z3A.20 20 WEEKS GESTATION OF PREGNANCY: Primary | ICD-10-CM

## 2025-02-28 PROCEDURE — 76811 OB US DETAILED SNGL FETUS: CPT | Performed by: OBSTETRICS & GYNECOLOGY

## 2025-02-28 PROCEDURE — 99213 OFFICE O/P EST LOW 20 MIN: CPT | Performed by: OBSTETRICS & GYNECOLOGY

## 2025-02-28 PROCEDURE — 76817 TRANSVAGINAL US OBSTETRIC: CPT | Performed by: OBSTETRICS & GYNECOLOGY

## 2025-02-28 NOTE — PROGRESS NOTES
The patient was seen today for an ultrasound.  Please see ultrasound report (located under Ob Procedures) for additional details.   Thank you very much for allowing us to participate in the care of this very nice patient.  Should you have any questions, please do not hesitate to contact me.     Lewis Fry MD FACOG  Attending Physician, Maternal-Fetal Medicine  [facility]

## 2025-02-28 NOTE — PROGRESS NOTES
Ultrasound Probe Disinfection    A transvaginal ultrasound was performed.   Prior to use, disinfection was performed with High Level Disinfection Process ([employer]).  Probe serial number A [id number] was used.    Lilliam Grimm  02/28/25  7:56 AM

## 2025-03-04 ENCOUNTER — ROUTINE PRENATAL (OUTPATIENT)
Dept: OBGYN CLINIC | Facility: CLINIC | Age: 40
End: 2025-03-04

## 2025-03-04 VITALS — BODY MASS INDEX: 29.6 KG/M2 | SYSTOLIC BLOOD PRESSURE: 110 MMHG | WEIGHT: 189 LBS | DIASTOLIC BLOOD PRESSURE: 70 MMHG

## 2025-03-04 DIAGNOSIS — Z3A.20 20 WEEKS GESTATION OF PREGNANCY: Primary | ICD-10-CM

## 2025-03-04 DIAGNOSIS — O99.281 HYPOTHYROIDISM DURING PREGNANCY IN FIRST TRIMESTER: ICD-10-CM

## 2025-03-04 DIAGNOSIS — O09.512 PRIMIGRAVIDA OF ADVANCED MATERNAL AGE IN SECOND TRIMESTER: ICD-10-CM

## 2025-03-04 DIAGNOSIS — E03.9 HYPOTHYROIDISM DURING PREGNANCY IN FIRST TRIMESTER: ICD-10-CM

## 2025-03-04 PROCEDURE — PNV: Performed by: NURSE PRACTITIONER

## 2025-03-04 NOTE — PROGRESS NOTES
"  OB/GYN  PN Visit  No Daigle  [id number]  3/4/2025  4:27 PM   JUAN Allen    S: No Daigle 40 y.o.  20w4d here for PN visit. She denies leakage of fluid and vaginal bleeding. She good good fetal movement. She denies nausea, vomiting, headache, cramping, edema, domestic violence, and smoking, ETOH or drug. Her pregnancy is complicated by AMA, hypothyroidism.     O:      Pre-Marcellus Vitals      Flowsheet Row Most Recent Value   Prenatal Assessment    Fetal Heart Rate 164   Fundal Height (cm) 20 cm   Movement Present   Prenatal Vitals    Blood Pressure 110/70   Weight - Scale 85.7 kg (189 lb)   Urine Albumin/Glucose    Dilation/Effacement/Station    Vaginal Drainage    Edema           Urine: neg/neg    A/P:    1. 20w4d GESTATION  - Continue PNV  - Labs: UTD. Needs to have AFP collected this week. Repeat TSH ordered.  - Genetics: NIPS negative  - Ultrasounds: 25 - anatomy survey normal.    - Repeat US for missed anatomy and growth at 32 weeks  - Tdap: offer at 28 weeks  - Flu Shot: declined   - COVID: vaccinated  - Rhogam: n/a - O positive    - RTO in 4 weeks or sooner if needed    USE THIS FOR OVERVIEW? (Can be edited in the \"Problem list\"  Problem List          Endocrine    Hypothyroidism during pregnancy in first trimester       Obstetrics/Gynecology    12 weeks gestation of pregnancy    Primigravida of advanced maternal age in second trimester     Other Visit Diagnoses         20 weeks gestation of pregnancy    -  Primary            USE THIS FOR \"DAN\" CHARTING (can be edited in the note)  Assessment & Plan  20 weeks gestation of pregnancy         Hypothyroidism during pregnancy in first trimester    Orders:    TSH, 3rd generation with Free T4 reflex; Future    Primigravida of advanced maternal age in second trimester             Future Appointments   Date Time Provider Department Center   3/4/2025  4:45 PM JUAN Allen [facility]   3/5/2025  7:00 PM [facility] " [facility] TOUR AN [facility]   2025  1:30 PM Maya Akins MD [facility]   2025  9:00 AM Malgorzata Estrada MD  [facility]   2025  1:45 PM Nichelle Gomez MD [facility]   2025 12:45 PM  US 1 [facility] AN [facility]   2025 11:30 AM Mima Gibbs MD [facility]   2025 11:45 AM JUAN Allen [facility]   2025 11:30 AM JUAN Allen [facility]   2025 11:45 AM JUAN Allen [facility]   7/10/2025 11:15 AM Ann Valenzuela MD [facility] Practice-W[facility]   2025  9:15 AM Ann Valenzuela MD [facility] Practice-W[facility]         JUAN Allen  3/4/2025  4:27 PM

## 2025-03-05 ENCOUNTER — CLINICAL SUPPORT (OUTPATIENT)
Age: 40
End: 2025-03-05

## 2025-03-05 DIAGNOSIS — Z32.2 ENCOUNTER FOR CHILDBIRTH INSTRUCTION: Primary | ICD-10-CM

## 2025-03-09 ENCOUNTER — RESULTS FOLLOW-UP (OUTPATIENT)
Dept: LABOR AND DELIVERY | Facility: HOSPITAL | Age: 40
End: 2025-03-09

## 2025-03-09 LAB
2ND TRIMESTER 4 SCREEN SERPL-IMP: NORMAL
AFP ADJ MOM SERPL: 1.24
AFP INTERP AMN-IMP: NORMAL
AFP INTERP SERPL-IMP: NORMAL
AFP INTERP SERPL-IMP: NORMAL
AFP SERPL-MCNC: 71 NG/ML
AGE AT DELIVERY: 39.5 YR
GA METHOD: NORMAL
GA: 21 WEEKS
IDDM PATIENT QL: NO
MULTIPLE PREGNANCY: NO
NEURAL TUBE DEFECT RISK FETUS: 5748 %
TSH SERPL DL<=0.005 MIU/L-ACNC: 1.99 UIU/ML (ref 0.45–4.5)

## 2025-03-10 ENCOUNTER — CLINICAL SUPPORT (OUTPATIENT)
Dept: POSTPARTUM | Facility: CLINIC | Age: 40
End: 2025-03-10

## 2025-03-10 DIAGNOSIS — Z32.2 ENCOUNTER FOR CHILDBIRTH INSTRUCTION: Primary | ICD-10-CM

## 2025-03-17 ENCOUNTER — NURSE TRIAGE (OUTPATIENT)
Age: 40
End: 2025-03-17

## 2025-03-17 NOTE — TELEPHONE ENCOUNTER
"FOLLOW UP: ESC to on call Dr. Gomez - agrees with recommendations, patient aware.    REASON FOR CONVERSATION: Groin Pain    SYMPTOMS: Reports intermittent 6/10 aching discomfort in pelvis and left groin since yesterday. More with standing and walking. Sitting, laying, resting helps. Notes relief of discomfort/pressure after urinating. Abel vaginal bleeding, cramping. Reports + fetal movement. Reviewed possible round ligament pain. Advised slow position changes, belly band or KT tape to help alleviate pressure, yoga/birthing ball, and tylenol.    OTHER: 22w3d     DISPOSITION: Home Care with Provider Message (overriding Home Care)      Reason for Disposition   Round ligament pain (previously diagnosed by physician), questions about    Answer Assessment - Initial Assessment Questions  1. LOCATION: \"Where does it hurt?\"       Pelvic and left groin  2. RADIATION: \"Does the pain shoot anywhere else?\" (e.g., chest, back)      no  3. ONSET: \"When did the pain begin?\" (Minutes, hours or days ago)       yesterday  4. SUDDEN: \"Gradual or sudden onset?\"      sudden  5. PATTERN: \"Does the pain come and go, or has it been constant since it started?\"       intermittent  6. SEVERITY: \"How bad is the pain?\" \"What does it keep you from doing?\"  (e.g., Scale 1-10; mild, moderate, or severe)      6/10 - irritating rather than painful  7. RECURRENT SYMPTOM: \"Have you ever had this type of stomach pain before?\" If Yes, ask: \"When was the last time?\" and \"What happened that time?\"       2 day  8. CAUSE: \"What do you think is causing the stomach pain?      Possible   9. RELIEVING/AGGRAVATING FACTORS: \"What makes it better or worse?\" (e.g., antacids, bowel movement, movement)      Stretching, urinating improves  10. FETAL MOVEMENT: \"Has the baby's movement decreased or changed significantly from normal?\"        good  11. OTHER SYMPTOMS: \"Do you have any other symptoms?\" (e.g., back pain, contractions, diarrhea, fever, headache, " "urination pain, vaginal bleeding, vaginal discharge, vomiting)        Denies vaginal pain,   12. CLAUDE: \"What date are you expecting to deliver?\"        7/18/25    Protocols used: Pregnancy - Abdominal Pain Greater Than 20 Weeks EGA-Adult-[place]    "

## 2025-04-04 ENCOUNTER — ROUTINE PRENATAL (OUTPATIENT)
Dept: OBGYN CLINIC | Facility: CLINIC | Age: 40
End: 2025-04-04

## 2025-04-04 VITALS — BODY MASS INDEX: 31.2 KG/M2 | DIASTOLIC BLOOD PRESSURE: 82 MMHG | WEIGHT: 199.2 LBS | SYSTOLIC BLOOD PRESSURE: 120 MMHG

## 2025-04-04 DIAGNOSIS — Z3A.25 25 WEEKS GESTATION OF PREGNANCY: ICD-10-CM

## 2025-04-04 DIAGNOSIS — Z34.02 PRENATAL CARE, FIRST PREGNANCY IN SECOND TRIMESTER: Primary | ICD-10-CM

## 2025-04-04 PROCEDURE — PNV: Performed by: STUDENT IN AN ORGANIZED HEALTH CARE EDUCATION/TRAINING PROGRAM

## 2025-04-04 NOTE — PROGRESS NOTES
"No \"Flory" is a 40 y.o.  25w0d. Reports ++FM, no LOF, VB, or regular contractions.     Vitals:    25 1337   BP: 120/82   S=D  +FHTs  Assessment & Plan  Prenatal care, first pregnancy in second trimester  - Continue PNV  - Call precautions reviewed  - Fetal kick counts--not yet due  - Ultrasounds: 25 - anatomy survey normal.               Repeat US for missed anatomy and growth at 32 weeks--2025  - Genetics cfDNA wnl, AFP wnl  - Labs: UTD   RH Status: POS   First Tri:completed   Third Tri:ordered 25   - Tdap: offer at 27 weeks  - Flu Shot: declined  - COVID: vaccinated  - Delivery: TBD  - Contraception: TBD  - Feeding: TBD   - Pediatrician: TBD--meeting with Chevy also considering   - RTO on 25    Orders:    Glucose, 1H PG; Future    CBC and Platelet; Future    RPR-Syphilis Screening (Total Syphilis IGG/IGM); Future    25 weeks gestation of pregnancy  Wondering about glycolic acid for deodorant    Orders:    Glucose, 1H PG; Future    CBC and Platelet; Future    RPR-Syphilis Screening (Total Syphilis IGG/IGM); Future          "

## 2025-04-16 LAB
EXTERNAL HEPATITIS B SURFACE ANTIGEN: NORMAL
EXTERNAL HIV-1 P24 ANTIGEN: NORMAL
EXTERNAL RUBELLA IGG QUANTITATION: NORMAL

## 2025-04-17 LAB
ERYTHROCYTE [DISTWIDTH] IN BLOOD BY AUTOMATED COUNT: 11.9 % (ref 11.7–15.4)
GLUCOSE 1H P 50 G GLC PO SERPL-MCNC: 126 MG/DL (ref 70–139)
HCT VFR BLD AUTO: 35.3 % (ref 34–46.6)
HGB BLD-MCNC: 12 G/DL (ref 11.1–15.9)
MCH RBC QN AUTO: 30.5 PG (ref 26.6–33)
MCHC RBC AUTO-ENTMCNC: 34 G/DL (ref 31.5–35.7)
MCV RBC AUTO: 90 FL (ref 79–97)
PLATELET # BLD AUTO: 176 X10E3/UL (ref 150–450)
RBC # BLD AUTO: 3.94 X10E6/UL (ref 3.77–5.28)
RPR SER QL: NON REACTIVE
WBC # BLD AUTO: 9.1 X10E3/UL (ref 3.4–10.8)

## 2025-04-18 ENCOUNTER — RESULTS FOLLOW-UP (OUTPATIENT)
Dept: OBGYN CLINIC | Facility: CLINIC | Age: 40
End: 2025-04-18

## 2025-04-23 ENCOUNTER — CLINICAL SUPPORT (OUTPATIENT)
Age: 40
End: 2025-04-23

## 2025-04-23 DIAGNOSIS — Z32.2 ENCOUNTER FOR CHILDBIRTH INSTRUCTION: Primary | ICD-10-CM

## 2025-05-02 ENCOUNTER — ROUTINE PRENATAL (OUTPATIENT)
Age: 40
End: 2025-05-02

## 2025-05-02 VITALS
HEIGHT: 67 IN | SYSTOLIC BLOOD PRESSURE: 112 MMHG | BODY MASS INDEX: 32.19 KG/M2 | WEIGHT: 205.11 LBS | DIASTOLIC BLOOD PRESSURE: 72 MMHG

## 2025-05-02 DIAGNOSIS — E03.9 HYPOTHYROIDISM DURING PREGNANCY IN FIRST TRIMESTER: ICD-10-CM

## 2025-05-02 DIAGNOSIS — O99.281 HYPOTHYROIDISM DURING PREGNANCY IN FIRST TRIMESTER: ICD-10-CM

## 2025-05-02 DIAGNOSIS — O09.513 PRIMIGRAVIDA OF ADVANCED MATERNAL AGE IN THIRD TRIMESTER: Primary | ICD-10-CM

## 2025-05-02 LAB
DME PARACHUTE DELIVERY DATE REQUESTED: NORMAL
DME PARACHUTE ITEM DESCRIPTION: NORMAL
DME PARACHUTE ORDER STATUS: NORMAL
DME PARACHUTE SUPPLIER NAME: NORMAL
DME PARACHUTE SUPPLIER PHONE: NORMAL

## 2025-05-02 PROCEDURE — PNV: Performed by: STUDENT IN AN ORGANIZED HEALTH CARE EDUCATION/TRAINING PROGRAM

## 2025-05-02 NOTE — PROGRESS NOTES
OB/GYN  PN Visit  No Daigle  [id number]  2025  9:04 AM  Malgorzata Estrada MD    S: 40 y.o.  29w0d here for PN visit-she denies any contractions, cramping, loss of fluid or vaginal bleeding.  She endorses good fetal movement.  She notes occasional edema in the lower extremities at the end of the day.  Denies any preeclamptic symptoms.      Pregnancy is complicated by subclinical hypothyroidism and AMA.      O:  Vitals:    25 0800   BP: 112/72       A/P:  #1. 29w0d GESTATION  - Continue PNV  - Call precautions reviewed  - Fetal kick counts reviewed today  - Ultrasounds: 25 - anatomy survey normal.               Repeat US for missed anatomy and growth at 32 weeks--2025  - Genetics cfDNA wnl, AFP wnl  - Labs: UTD               RH Status: POS               First Tri:completed               Third Tri: completed and wnl-reviewed today  - Tdap: Discussed today and will consider-not available in office today, please offer at next visit.  - Flu Shot: declined  - COVID: vaccinated  - Delivery:  with epidural.  - Contraception: TBD  - Feeding: Breast and pumping-breast pump ordered today  - Pediatrician: meeting with Chevy also considering   - RTO in 2 weeks or sooner if needed.        Malgorzata Estrada MD  2025  9:04 AM

## 2025-05-05 ENCOUNTER — CLINICAL SUPPORT (OUTPATIENT)
Dept: POSTPARTUM | Facility: CLINIC | Age: 40
End: 2025-05-05

## 2025-05-05 DIAGNOSIS — Z32.2 ENCOUNTER FOR CHILDBIRTH INSTRUCTION: Primary | ICD-10-CM

## 2025-05-07 ENCOUNTER — CLINICAL SUPPORT (OUTPATIENT)
Dept: POSTPARTUM | Facility: CLINIC | Age: 40
End: 2025-05-07

## 2025-05-07 DIAGNOSIS — Z32.2 ENCOUNTER FOR CHILDBIRTH INSTRUCTION: Primary | ICD-10-CM

## 2025-05-12 ENCOUNTER — OFFICE VISIT (OUTPATIENT)
Dept: FAMILY MEDICINE CLINIC | Facility: CLINIC | Age: 40
End: 2025-05-12
Payer: COMMERCIAL

## 2025-05-12 ENCOUNTER — CLINICAL SUPPORT (OUTPATIENT)
Dept: POSTPARTUM | Facility: CLINIC | Age: 40
End: 2025-05-12

## 2025-05-12 VITALS
RESPIRATION RATE: 16 BRPM | SYSTOLIC BLOOD PRESSURE: 120 MMHG | WEIGHT: 208.2 LBS | TEMPERATURE: 98.7 F | HEART RATE: 110 BPM | BODY MASS INDEX: 31.55 KG/M2 | DIASTOLIC BLOOD PRESSURE: 82 MMHG | HEIGHT: 68 IN | OXYGEN SATURATION: 98 %

## 2025-05-12 DIAGNOSIS — R00.2 PALPITATIONS: ICD-10-CM

## 2025-05-12 DIAGNOSIS — Z3A.30 30 WEEKS GESTATION OF PREGNANCY: ICD-10-CM

## 2025-05-12 DIAGNOSIS — E03.9 HYPOTHYROIDISM, UNSPECIFIED TYPE: Primary | ICD-10-CM

## 2025-05-12 DIAGNOSIS — Z32.2 ENCOUNTER FOR CHILDBIRTH INSTRUCTION: Primary | ICD-10-CM

## 2025-05-12 PROCEDURE — 99204 OFFICE O/P NEW MOD 45 MIN: CPT | Performed by: FAMILY MEDICINE

## 2025-05-12 NOTE — PROGRESS NOTES
9/13/2019        RE: Jazmin Clifton  [address]  [place] [address]        [facility]  PRIMARY CARE PROVIDER AND CLINIC:  MARY Gómez [facility], [address] [place] [address]  Chief Complaint   Patient presents with     Establish Care     [facility] Record Number:  [id number]  Place of Service where encounter took place:  [facility] ([facility]) [[id number]]    Jazmin Clifton  is a 87 year old  (12/30/1931), admitted to the above facility from  [facility]. Hospital stay 9/9/2019 through 9/10/2019..  Admitted to this facility for  rehab, medical management and nursing care.    HPI:    HPI information obtained from: facility chart records, facility staff, patient report and [facility] chart review.   Brief Summary of Hospital Course: Yajaira Clifton is an 87-year-old female with past medical history of CHF, COPD, CKD, CVA, GERD, HTN, anemia, spinal stenosis with multiple lumbar surgeries, chronic pain syndrome with chronic use of narcotics, and osteoarthritis who presented to the emergency department on 9/1/2019 with complaints of malaise, nausea and vomiting, and left jaw swelling.  CT showed submandibular  that were sialadenitis, though no obstruction or blockage from stones.  She was started on course of Flagyl and cefuroxime.  Gland became smaller and less tender.  She was discharged to TCU on 9/6.  On 9/9 she presented with acute increase the left hand swelling so was sent to emergency department due to concerns for CVA.  Negative for DVT head CT negative, and she was discharged back to TCU.  However [facility] to complete her rehab on 9/12/2019.  Isradipine was changed to nifedipine XL at last facility as unable to get isradipine from pharmacy.   Updates on Status Since Skilled nursing Admission:   She was seen by ENT this a.m.  We recommended  "Name: No Daigle      : 1985      MRN: [id number]  Encounter Provider: Eloise Horn MD  Encounter Date: 2025   Encounter department: [facility]  :  Assessment & Plan  Hypothyroidism, unspecified type    Orders:  •  Ambulatory Referral to Endocrinology; Future  •  Ambulatory Referral to Cardiology; Future    Palpitations    Orders:  •  Ambulatory Referral to Cardiology; Future    30 weeks gestation of pregnancy         BMI 31.0-31.9,adult                History of Present Illness   HPI  Review of Systems    Objective   /82 (BP Location: Left arm, Patient Position: Sitting, Cuff Size: Large)   Pulse (!) 110   Temp 98.7 °F (37.1 °C) (Temporal)   Resp 16   Ht 5' 8\" (1.727 m)   Wt 94.4 kg (208 lb 3.2 oz)   LMP 10/11/2024 (Exact Date)   SpO2 98%   BMI 31.66 kg/m²      Physical Exam    " discontinuing the Flagyl and continuing on this  cefuroxime for 14 additional days.  She ran says the swelling in her left jaw is much better, no real tenderness with palpation, but continues to have some swelling.  Deepti did have diarrhea while in the hospital, but states is been slowly improving.  RAJENDRA does have difficulty chewing.  She is missing multiple teeth, is awaiting tooth extraction on 10/11/2019.  She has been continuing with hot packs and sucking on lemon candies to promote salivation.    Yajaira states appetite is not back to normal, but is improving.  Has not had a bowel movement yet today, denies any abdominal pain nausea or bloating.    Denies any chest pain or shortness of breath.  Only ambulates very short distances at baseline.  Has chronic shoulder hip and back pain, does follow with the chronic pain clinic.    CODE STATUS/ADVANCE DIRECTIVES DISCUSSION:   DNR / DNI  Patient's living condition: lives in an assisted living facility  ALLERGIES: Accupril; Ace inhibitors; Augmentin; Blood-group specific substance; Levofloxacin; Morphine; Nitrofurantoin; Norvasc [amlodipine besylate]; and Quinapril  PAST MEDICAL HISTORY:  has a past medical history of ABDOMINAL PAIN GENERALIZED (3/15/2006), Abdominal pain, generalized (3/15/2006), Atherosclerosis of renal artery (H), BENIGN HYPERTENSION (5/1/2006), Benign neoplasm of scalp and skin of neck, Cerebral aneurysm, nonruptured, Congestive heart failure (H), Depressive disorder, not elsewhere classified, Esophageal reflux, Female stress incontinence, Gastrointestinal malfunction arising from mental factors, Generalized osteoarthrosis, unspecified site, Herpes zoster dermatitis of eyelid, Insomnia, unspecified, Lumbago, Other chest pain, Other specified cardiac dysrhythmias(427.89), Rectocele, Unspecified disorder of kidney and ureter, and Unspecified essential hypertension.  PAST SURGICAL HISTORY:   has a past surgical history that includes surgical history of - ;  surgical history of - ; surgical history of -  (1996); surgical history of - ; surgical history of - ; cholecystectomy, laporoscopic (1997); hysterectomy, mirtha (1982); Endarterectomy carotid (Right, 8/31/2017); Cystoscopy, Biopsy Urethra (N/A, 6/10/2019); and Urethroplasty (N/A, 6/10/2019).  FAMILY HISTORY: family history includes Asthma in her son; Cancer in her brother and mother; Cerebrovascular Disease in her father; Diabetes in her son; Eye Disorder in her son; Gastrointestinal Disease in her son; Heart Disease in her father.  SOCIAL HISTORY:   reports that she quit smoking about 27 years ago. She has never used smokeless tobacco. She reports that she drinks alcohol. She reports that she does not use drugs.    Post Discharge Medication Reconciliation Status: discharge medications reconciled and changed, per note/orders (see AVS)    Current Outpatient Medications   Medication Sig Dispense Refill     acetaminophen (TYLENOL) 500 MG tablet Take 1,000 mg by mouth 3 times daily       ASPIRIN LOW DOSE 81 MG chewable tablet CHEW AND SWALLOW ONE TABLET BY MOUTH ONCE DAILY 30 tablet 11     atorvastatin (LIPITOR) 40 MG tablet TAKE 1 TABLET BY MOUTH ONCE DAILY 30 tablet 98     bisacodyl (DULCOLAX) 10 MG suppository Place 10 mg rectally daily as needed for constipation       Blood Glucose Monitoring Suppl CORY 2 times daily Call nurse for further directions if blood glucose is less than 80 or greater than 250       BREO ELLIPTA 100-25 MCG/INH inhaler INHALE 1 PUFF BY MOUTH ONCE DAILY 1 Inhaler 11     calcium carbonate (TUMS) 500 MG chewable tablet Take 1 chew tab by mouth every hour as needed for heartburn       DONEPEZIL HCL PO Take 5 mg by mouth At Bedtime        DULoxetine (CYMBALTA) 30 MG capsule Take 2 capsules (60 mg) by mouth daily AND 1 capsule (30 mg) At Bedtime. 90 capsule 11     furosemide (LASIX) 40 MG tablet Take 1 tablet (40 mg) by mouth 2 times daily 60 tablet 11     gabapentin (NEURONTIN) 100 MG capsule Take  2 capsules (200 mg) by mouth 2 times daily 120 capsule 11     Hypromellose (NATURAL BALANCE TEARS OP) Place 1 drop into both eyes every 6 hours as needed (to both eyes)        levalbuterol (XOPENEX) 1.25 MG/3ML neb solution Take 1 ampule by nebulization every 4 hours as needed for shortness of breath / dyspnea or wheezing And every 4 hours PRN       Lidocaine (LIDOCARE) 4 % Patch Place 1 patch onto the skin daily as needed To desired area (shoulder or hip). On for 12hrs, off for 12hrs       lisinopril (PRINIVIL/ZESTRIL) 20 MG tablet Take 1 tablet (20 mg) by mouth 2 times daily 62 tablet 98     magnesium hydroxide (MILK OF MAGNESIA) 400 MG/5ML suspension Take 30 mLs by mouth daily as needed for constipation or heartburn       MAGNESIUM OXIDE PO Take 400 mg by mouth every other day       Menthol, Topical Analgesic, (BIOFREEZE) 4 % GEL Externally apply topically 4 times daily as needed To left shoulder and right hip       metroNIDAZOLE (FLAGYL) 250 MG tablet Take 250 mg by mouth 2 times daily 20 tablet      NIFEdipine ER (ADALAT CC) 30 MG 24 hr tablet Take 30 mg by mouth daily       nystatin (MYCOSTATIN) 583641 UNIT/GM external powder APPLY TOPICALLY TO ABDOMEN FOLDS, GROIN, AND BREASTS TWICE DAILY AS NEEDED 60 g 97     OMEPRAZOLE PO Take 40 mg by mouth every morning       ondansetron (ZOFRAN) 4 MG tablet Take 4 mg by mouth every 6 hours as needed for nausea       oxyCODONE (OXYCONTIN) 10 MG 12 hr tablet Take 1 tablet (10 mg) by mouth every 12 hours maximum 2 tablet(s) per day 60 tablet 0     oxyCODONE (ROXICODONE) 5 MG tablet Take 1 tablet (5 mg) by mouth daily as needed for severe pain 30 tablet 0     phenazopyridine (PYRIDIUM) 100 MG tablet Take 1 tablet (100 mg) by mouth 3 times daily as needed for urinary tract discomfort 9 tablet 1     senna-docusate (SENOKOT-S/PERICOLACE) 8.6-50 MG tablet Take 1 tablet by mouth 2 times daily as needed for constipation        tiZANidine (ZANAFLEX) 2 MG tablet TAKE 1 TABLET BY  "MOUTH THREE TIMES DAILY AS NEEDED 12 tablet 0     vitamin D3 (CHOLECALCIFEROL) 1000 units (25 mcg) tablet Take 1 tablet (1,000 Units) by mouth daily 60 tablet 11     cefuroxime (CEFTIN) 500 MG tablet Take 1 tablet (500 mg) by mouth every 12 hours for 14 days 28 tablet 0       ROS:  10 point ROS of systems including Constitutional, Eyes, Respiratory, Cardiovascular, Gastroenterology, Genitourinary, Integumentary, Musculoskeletal, Psychiatric were all negative except for pertinent positives noted in my HPI.    Vitals:  BP (!) 162/87   Pulse 77   Temp 98.1  F (36.7  C)   Resp 20   Ht 1.588 m (5' 2.5\")   Wt 87.7 kg (193 lb 6.4 oz)   SpO2 94%   BMI 34.81 kg/m     Exam:  GENERAL APPEARANCE:  Alert, in no distress, oriented, cooperative  ENT:  Mouth and posterior oropharynx normal, moist mucous membranes, poor dentition, Palpable submandibular gland to left jaw, nontender.  RESP:  respiratory effort and palpation of chest normal, lungs clear to auscultation , no respiratory distress  CV:  Palpation and auscultation of heart done , regular rate and rhythm, no murmur, rub, or gallop, +2 pedal pulses, peripheral edema 2+ in BLE,   ABDOMEN:  no guarding or rebound, bowel sounds normal, soft, non-tender  M/S:   Decreased range of motion to right hip and left shoulder, and a very small steps, slightly unsteady gait.   SKIN:  Inspection of skin and subcutaneous tissue baseline, Seroma palpable to right thigh/groin  NEURO:   Cranial nerves 2-12 are normal tested and grossly at patient's baseline  PSYCH:  oriented X 3, normal insight, judgement and memory, affect and mood normal    Lab/Diagnostic data:  Recent labs in [facility] reviewed by me today.     ASSESSMENT/PLAN:  (K11.20) Sialadenitis  (primary encounter diagnosis)  Comment: Likely bacterial infection has no obstruction or stones noted on imaging.  Seen by ENT today.  Plan: Flagyl discontinued, continue cefuroxime x14 additional days.  Continue lemon candies to increase " salivation and hot packs to site.  If continues to have swollen gland after completion of antibiotics update ENT.    (I50.32) Chronic diastolic congestive heart failure (H)  (I10) Essential hypertension  Comment: Appears compensated currently.  Plan: Continue aspirin 81 mg daily, Lipitor 40 mg daily, furosemide 40 mg twice daily, lisinopril 20 mg twice daily, nifedipine Er 30 mg daily (though if difficulty with control will transition back to isradipine).  Daily weights.  Low-salt diet.  Staff to bring up to apartment so can collect compression garments for treatment of seroma and lower extremity edema.    (N18.3) CKD (chronic kidney disease) stage 3, GFR 30-59 ml/min (H)  Comment: Stable during recent hospitalization; baseline creatinine 1-1.3  Plan: Avoid nephrotoxic medications, BMP to monitor for stability    (G89.4) Chronic pain syndrome  (M16.11) Osteoarthritis of right hip, unspecified osteoarthritis type  (M48.061) Spinal stenosis of lumbar region, unspecified whether neurogenic claudication present  Comment: Pain at baseline controlled currently.  Follows with pain clinic.    Plan: Oxycodone 5 mg daily as needed. Continue OxyContin 10 mg twice daily, benzoyl 4 times daily as needed, lidocaine patch as needed to shoulder or hip.  Tylenol 1000 mg 3 times daily    (N18.4,  D63.1) Anemia of chronic renal failure, stage 4 (severe) (H)  Comment: Iron supplement discontinued earlier this year, hemoglobin remained stable  Plan: CBC next week to monitor for stability.    (J44.9) Chronic obstructive pulmonary disease, unspecified COPD type (H)  Comment: No signs or symptoms of exacerbation currently  Plan: Continue Deidre Ellipta 1 puff daily, levo albuterol nebs as needed for shortness of breath.  Continue to monitor and adjust    Physical deconditioning  Plan: PT and OT to evaluate and treat.  Limited mobility at baseline due to chronic pain and arthritis.    Diarrhea  Comment: Suspect due to recent illness and  antibiotic use  Plan: We will discontinue magnesium supplement as could be contributing, monitor.  Encourage adequate fluid intake.    transcribed by : DINA Lewis  1. Discontinue Magnesium supplement  2. Discontinue Vitamin D pyridium  3. Mg,BMP, CBC on 9/18 - Dx - CKD, sialoadenitis     Total time spent with patient visit at the skilled nursing facility was 45 min including patient visit and review of past records. Greater than 50% of total time spent with counseling and coordinating care due to coordinating care with nurse on admission orders, coordinating care with follow up labs and appointments and counseling patient/resident for 25 minutes on: the reason for their hospitalization and what the treatment is, the plan of SNF stay and projected length of stay, current medications (treatments) reconciled from the hospital, recent past lab and imaging results and subsequent treatment plan and current pain control plan and controlled substances ordered and taper plan of care.    Electronically signed by:  MARY Gómez CNP     Disclaimer:  This note consists of symbols derived from keyboarding, dictation, and/or voice recognition software.  As a result, there may be errors in the script that have gone undetected.  Please consider this when interpreting information found in the chart                    Sincerely,        MARY Gómez CNP

## 2025-05-16 ENCOUNTER — ROUTINE PRENATAL (OUTPATIENT)
Dept: OBGYN CLINIC | Facility: CLINIC | Age: 40
End: 2025-05-16
Payer: COMMERCIAL

## 2025-05-16 VITALS — DIASTOLIC BLOOD PRESSURE: 72 MMHG | BODY MASS INDEX: 31.78 KG/M2 | SYSTOLIC BLOOD PRESSURE: 114 MMHG | WEIGHT: 209 LBS

## 2025-05-16 DIAGNOSIS — Z23 ENCOUNTER FOR IMMUNIZATION: ICD-10-CM

## 2025-05-16 DIAGNOSIS — O09.513 PRIMIGRAVIDA OF ADVANCED MATERNAL AGE IN THIRD TRIMESTER: Primary | ICD-10-CM

## 2025-05-16 DIAGNOSIS — O26.899 CARPAL TUNNEL SYNDROME DURING PREGNANCY: ICD-10-CM

## 2025-05-16 DIAGNOSIS — G56.00 CARPAL TUNNEL SYNDROME DURING PREGNANCY: ICD-10-CM

## 2025-05-16 PROCEDURE — 90715 TDAP VACCINE 7 YRS/> IM: CPT | Performed by: STUDENT IN AN ORGANIZED HEALTH CARE EDUCATION/TRAINING PROGRAM

## 2025-05-16 PROCEDURE — PNV: Performed by: STUDENT IN AN ORGANIZED HEALTH CARE EDUCATION/TRAINING PROGRAM

## 2025-05-16 PROCEDURE — 90471 IMMUNIZATION ADMIN: CPT | Performed by: STUDENT IN AN ORGANIZED HEALTH CARE EDUCATION/TRAINING PROGRAM

## 2025-05-16 NOTE — ASSESSMENT & PLAN NOTE
No presents today for her OB visit.  She is currently 31w0d.    I have given the patient the third trimester OB packet with instructions to review and discuss it with her partner, complete it and bring it with her to the labor and delivery.      Warning signs in pregnancy:    I have reviewed the warning signs of pregnancy in the third trimester and advised patient to notify provider immediately if she experiences any of the following:  vaginal bleeding, baby moving less than normal or not at all, or abdominal pain.    Contraceptive options were reviewed, including hormonal methods, both combination (pill, patch, vaginal ring) and progesterone-only (pill, Depo Provera and Nexplanon), intrauterine devices (Mirena, Krista and Paragard), barrier methods (condoms, diaphragm) and male/female sterilization.  I have reviewed the breastfeeding friendly contraceptive options also.  The mechanisms, risks, benefits and side effects of all methods were discussed.  All questions have been answered to her satisfaction.

## 2025-05-16 NOTE — PROGRESS NOTES
Assessment/Plan  1. Primigravida of advanced maternal age in third trimester  Overview:  - Continue PNV  - Call precautions reviewed  - Fetal kick counts reviewed today  - Ultrasounds: 25 - anatomy survey normal.               Repeat US for missed anatomy and growth at 32 weeks--2025  - Genetics cfDNA wnl, AFP wnl  - Labs: UTD               RH Status: POS               First Tri:completed               Third Tri: up to date   - Tdap: administered  - Flu Shot: declined  - COVID: vaccinated  - Delivery:  with epidural.  - Contraception: TBD  - Feeding: pump ordered   - Pediatrician: meeting with [facility] also considering   - RTO in 2 weeks or sooner if needed.  Assessment & Plan:    No presents today for her OB visit.  She is currently 31w0d.    I have given the patient the third trimester OB packet with instructions to review and discuss it with her partner, complete it and bring it with her to the labor and delivery.      Warning signs in pregnancy:    I have reviewed the warning signs of pregnancy in the third trimester and advised patient to notify provider immediately if she experiences any of the following:  vaginal bleeding, baby moving less than normal or not at all, or abdominal pain.    Contraceptive options were reviewed, including hormonal methods, both combination (pill, patch, vaginal ring) and progesterone-only (pill, Depo Provera and Nexplanon), intrauterine devices (Mirena, Krista and Paragard), barrier methods (condoms, diaphragm) and male/female sterilization.  I have reviewed the breastfeeding friendly contraceptive options also.  The mechanisms, risks, benefits and side effects of all methods were discussed.  All questions have been answered to her satisfaction.      2. Encounter for immunization  -     Tdap Vaccine greater than or equal to 6yo  3. Carpal tunnel syndrome during pregnancy      Subjective    No is a 40 y.o. female,  with an Estimated Date of Delivery:  7/18/25 with a current gestational age of 31w0d. Patient reports numbness and tingling in the hands, in the AM. Fetal movement: active.     History  The following portions of the patient's history were reviewed and updated as appropriate: allergies, current medications, past family history, past medical history, past social history, past surgical history and problem list.        Objective  Vitals:    05/16/25 1351   BP: 114/72     FHT: + FHT   Urine: trace/neg        Carpal tunnel- reviewed wrist splints for treatment

## 2025-05-19 ENCOUNTER — CLINICAL SUPPORT (OUTPATIENT)
Dept: POSTPARTUM | Facility: CLINIC | Age: 40
End: 2025-05-19

## 2025-05-19 DIAGNOSIS — Z32.2 ENCOUNTER FOR CHILDBIRTH INSTRUCTION: Primary | ICD-10-CM

## 2025-05-21 ENCOUNTER — ULTRASOUND (OUTPATIENT)
Facility: HOSPITAL | Age: 40
End: 2025-05-21
Payer: COMMERCIAL

## 2025-05-21 ENCOUNTER — ANCILLARY PROCEDURE (OUTPATIENT)
Facility: HOSPITAL | Age: 40
End: 2025-05-21
Attending: STUDENT IN AN ORGANIZED HEALTH CARE EDUCATION/TRAINING PROGRAM
Payer: COMMERCIAL

## 2025-05-21 VITALS
DIASTOLIC BLOOD PRESSURE: 70 MMHG | HEIGHT: 68 IN | SYSTOLIC BLOOD PRESSURE: 110 MMHG | HEART RATE: 94 BPM | WEIGHT: 209.2 LBS | BODY MASS INDEX: 31.71 KG/M2

## 2025-05-21 DIAGNOSIS — O09.513 PRIMIGRAVIDA OF ADVANCED MATERNAL AGE IN THIRD TRIMESTER: ICD-10-CM

## 2025-05-21 DIAGNOSIS — Z3A.31 31 WEEKS GESTATION OF PREGNANCY: ICD-10-CM

## 2025-05-21 DIAGNOSIS — Z91.89 AT RISK FOR HYPERTENSION: ICD-10-CM

## 2025-05-21 DIAGNOSIS — Z3A.31 31 WEEKS GESTATION OF PREGNANCY: Primary | ICD-10-CM

## 2025-05-21 PROCEDURE — 76816 OB US FOLLOW-UP PER FETUS: CPT | Performed by: OBSTETRICS & GYNECOLOGY

## 2025-05-21 PROCEDURE — 99212 OFFICE O/P EST SF 10 MIN: CPT | Performed by: OBSTETRICS & GYNECOLOGY

## 2025-05-21 RX ORDER — HYDROGEN PEROXIDE 2.65 ML/100ML
162 LIQUID ORAL; TOPICAL DAILY
Qty: 90 TABLET | Refills: 0 | Status: SHIPPED | OUTPATIENT
Start: 2025-05-21

## 2025-05-21 NOTE — PROGRESS NOTES
"On exam today, the patient appears well, in no acute distress, and denies any complaints.    There has been appropriate interval fetal growth. Good fetal movement and tone are seen. The amniotic fluid volume appears normal. The patient was informed of today's findings and all of her questions were answered. The limitations of ultrasound were reviewed.  labor precautions and fetal kick counts were reviewed.    We discussed follow-up in detail and I recommend the patient return as clinically indicated.    Thank you very much for allowing us to participate in the care of this very nice patient. Should you have any questions, please do not hesitate to contact our office.    Please note I spent 5 minutes reviewing records prior to the visit, 5 minutes in patient contact including counseling and coordination of care, and 5 minutes in charting in the electronic medical record. Total time spent for the encounter is 15 minutes.    Portions of the record may have been created with voice recognition software. Occasional wrong word or \"sound a like\" substitutions may have occurred due to the inherent limitations of voice recognition software. Read the chart carefully and recognize, using context, where substitutions have occurred.  "

## 2025-05-28 ENCOUNTER — CLINICAL SUPPORT (OUTPATIENT)
Age: 40
End: 2025-05-28

## 2025-05-28 DIAGNOSIS — Z32.2 ENCOUNTER FOR CHILDBIRTH INSTRUCTION: Primary | ICD-10-CM

## 2025-05-30 ENCOUNTER — ROUTINE PRENATAL (OUTPATIENT)
Dept: OBGYN CLINIC | Facility: CLINIC | Age: 40
End: 2025-05-30

## 2025-05-30 VITALS — SYSTOLIC BLOOD PRESSURE: 104 MMHG | DIASTOLIC BLOOD PRESSURE: 68 MMHG | BODY MASS INDEX: 32.42 KG/M2 | WEIGHT: 213.2 LBS

## 2025-05-30 DIAGNOSIS — Z34.03 PRENATAL CARE, FIRST PREGNANCY IN THIRD TRIMESTER: Primary | ICD-10-CM

## 2025-05-30 PROCEDURE — PNV: Performed by: OBSTETRICS & GYNECOLOGY

## 2025-05-30 NOTE — PROGRESS NOTES
Patient reports good fm, no n/v, headache, cramping, bleeding, loss of fluid,  dom violence, or smoking.  tanya pnv mild edema  Assessment & Plan  Prenatal care, first pregnancy in third trimester  - Continue PNV  - Call precautions reviewed  - Fetal kick counts reviewed previously  - Ultrasounds: 25 - anatomy survey normal.               2025 68% vtx nl  - Genetics cfDNA wnl, AFP wnl  - Labs: UTD               RH Status: POS               First Tri:completed               Third Tri: up to date   - Tdap: administered  - Flu Shot: declined  - COVID: vaccinated  - Delivery:  with epidural.  - Contraception: TBD  - Feeding: pump ordered   - Pediatrician: meeting with Chevy also considering   -patient has 30 week pack  - RTO in 2 weeks or sooner if needed.

## 2025-06-02 ENCOUNTER — NURSE TRIAGE (OUTPATIENT)
Age: 40
End: 2025-06-02

## 2025-06-02 NOTE — TELEPHONE ENCOUNTER
"REASON FOR CONVERSATION: Cyst    SYMPTOMS: 33w3d,  Hard cyst quarter size noted to right buttocks about 1\" from anus.  Unable to really see, but feels swollen.  Has been doing warm compresses without relief.  Advised to follow up with PCP.      OTHER HEALTH INFORMATION:   Bilateral foot/ankle swelling and hand swelling started Friday.  Improved over the weekend. Denies abdominal pain, fevers, headaches, vision changes, facial swelling.  +fetal movement.      PROTOCOL DISPOSITION: Contact PCP, See Within 2 Weeks in Office    CARE ADVICE PROVIDED: Continue warm moist compresses, see PCP or go to ED for further evaluation.   Monitor swelling, elevate feet when possible, can use compression socks, stay hydrated.  Call back if you develop headache, vision changes, abdominal pain, decreased fetal movement or increased swelling.   ESC to on call provider for additional recommendations. No additional recommendations.     PRACTICE FOLLOW-UP: None      Reason for Disposition   MILD swelling of both ankles (i.e., pedal edema)   Patient wants to be seen    Answer Assessment - Initial Assessment Questions  1. APPEARANCE of SWELLING: \"What does it look like?\"      Some swelling hard to tell, can't really see it.  2. SIZE: \"How large is the swelling?\" (e.g., inches, cm; or compare to size of pinhead, tip of pen, eraser, coin, pea, grape, ping pong ball)       Quarter   3. LOCATION: \"Where is the swelling located?\"      Right buttocks about an inch from anus  4. ONSET: \"When did the swelling start?\"      Started Sat  5. COLOR: \"What color is it?\" \"Is there more than one color?\"      Can't really see  6. PAIN: \"Is there any pain?\" If Yes, ask: \"How bad is the pain?\" (Scale 1-10; or mild, moderate, severe)        Moderate  7. ITCH: \"Does it itch?\" If Yes, ask: \"How bad is the itch?\"       denies  8. CAUSE: \"What do you think caused the swelling?\"      unsure  9 OTHER SYMPTOMS: \"Do you have any other symptoms?\" (e.g., fever)      " "denies    Answer Assessment - Initial Assessment Questions  1. ONSET: \"When did the swelling start?\" (e.g., minutes, hours, days)      Friday evening  2. LOCATION: \"What part of the leg is swollen?\"  \"Are both legs swollen or just one leg?\"      Both, feet/ankles but feels like entire legs  3. SEVERITY: \"How bad is the swelling?\" (e.g., localized; mild, moderate, severe)      Mild non-pitting  4. REDNESS: \"Does the swelling look red or infected?\"      denies  5. PAIN: \"Is the swelling painful to touch?\" If Yes, ask: \"How painful is it?\"   (Scale 1-10; mild, moderate or severe)      denies  6. FEVER: \"Do you have a fever?\" If Yes, ask: \"What is it, how was it measured, and when did it start?\"       denies  7. CAUSE: \"What do you think is causing the leg swelling?\"      unsure  8. MEDICAL HISTORY: \"Do you have a history of blood clots, heart failure, kidney disease, liver failure, or cancer?\"      denies  9. OTHER SYMPTOMS: \"Do you have any other symptoms?\" (e.g., abdomen pain, chest pain, difficulty breathing, headache, vision changes)      Hands swelling as well, denies headaches/vision changes or swelling of face.  10. CLAUDE: \"What date are you expecting to deliver?\"        7/18/25    Protocols used: Skin Lump or Localized Swelling-Adult-OH, Pregnancy - Leg Swelling and Edema-Adult-OH    "

## 2025-06-03 ENCOUNTER — NURSE TRIAGE (OUTPATIENT)
Age: 40
End: 2025-06-03

## 2025-06-03 ENCOUNTER — OFFICE VISIT (OUTPATIENT)
Dept: FAMILY MEDICINE CLINIC | Facility: CLINIC | Age: 40
End: 2025-06-03
Payer: COMMERCIAL

## 2025-06-03 ENCOUNTER — TELEPHONE (OUTPATIENT)
Age: 40
End: 2025-06-03

## 2025-06-03 ENCOUNTER — CONSULT (OUTPATIENT)
Dept: SURGERY | Facility: CLINIC | Age: 40
End: 2025-06-03
Attending: FAMILY MEDICINE
Payer: COMMERCIAL

## 2025-06-03 VITALS
OXYGEN SATURATION: 98 % | DIASTOLIC BLOOD PRESSURE: 78 MMHG | RESPIRATION RATE: 16 BRPM | BODY MASS INDEX: 31.83 KG/M2 | SYSTOLIC BLOOD PRESSURE: 118 MMHG | WEIGHT: 210 LBS | HEIGHT: 68 IN | TEMPERATURE: 98.1 F | HEART RATE: 80 BPM

## 2025-06-03 VITALS
RESPIRATION RATE: 18 BRPM | DIASTOLIC BLOOD PRESSURE: 80 MMHG | WEIGHT: 212 LBS | OXYGEN SATURATION: 96 % | TEMPERATURE: 97.3 F | HEART RATE: 116 BPM | SYSTOLIC BLOOD PRESSURE: 112 MMHG | BODY MASS INDEX: 32.13 KG/M2 | HEIGHT: 68 IN

## 2025-06-03 DIAGNOSIS — L02.31 ABSCESS OF RIGHT BUTTOCK: Primary | ICD-10-CM

## 2025-06-03 DIAGNOSIS — L02.31 ABSCESS, GLUTEAL, RIGHT: Primary | ICD-10-CM

## 2025-06-03 PROCEDURE — 99204 OFFICE O/P NEW MOD 45 MIN: CPT | Performed by: STUDENT IN AN ORGANIZED HEALTH CARE EDUCATION/TRAINING PROGRAM

## 2025-06-03 PROCEDURE — 99213 OFFICE O/P EST LOW 20 MIN: CPT | Performed by: FAMILY MEDICINE

## 2025-06-03 PROCEDURE — 10060 I&D ABSCESS SIMPLE/SINGLE: CPT | Performed by: STUDENT IN AN ORGANIZED HEALTH CARE EDUCATION/TRAINING PROGRAM

## 2025-06-03 NOTE — ASSESSMENT & PLAN NOTE
Patient who is currently almost 34 weeks pregnant comes in with right gluteal abscess. Pain has been ongoing for past few days. While in the waiting room, she did experienced discharge however on exam there remains fluctuance erythematous tender right gluteal abscess needing incision and further drainage.    I&D performed today and wound packed. Instructed patient on wound care going forward with removal of packing tomorrow, continue daily sitz bath with shower and dry dressing on top. Avoid prolonged sitting if possible. No other restrictions and no need for antibiotics.    Return in 1-2 weeks for wound check.

## 2025-06-03 NOTE — PROGRESS NOTES
Name: No Daigle      : 1985      MRN: [id number]  Encounter Provider: Dada Walters DO  Encounter Date: 6/3/2025   Encounter department: [facility] GENERAL SURGERY [place]  :  Assessment & Plan  Abscess, gluteal, right  Patient who is currently almost 34 weeks pregnant comes in with right gluteal abscess. Pain has been ongoing for past few days. While in the waiting room, she did experienced discharge however on exam there remains fluctuance erythematous tender right gluteal abscess needing incision and further drainage.    I&D performed today and wound packed. Instructed patient on wound care going forward with removal of packing tomorrow, continue daily sitz bath with shower and dry dressing on top. Avoid prolonged sitting if possible. No other restrictions and no need for antibiotics.    Return in 1-2 weeks for wound check.             History of Present Illness   No Daigle is a 40 y.o. female who presents right gluteal abscess  Patient presents with:  Abscess: Patient is here today regarding Right inner buttock abscess  ongoing 3 days.Patient just went to the bathroom and the abscess opened and drained while waiting in the waiting room felt a little relief of pressure pain went from 10-5.         Abscess  Pertinent negatives include no abdominal pain, chest pain, chills, congestion, coughing, fever, headaches, myalgias, nausea, rash, sore throat, vomiting or weakness.     Review of Systems   Constitutional:  Negative for chills and fever.   HENT:  Negative for congestion and sore throat.    Eyes:  Negative for discharge and visual disturbance.   Respiratory:  Negative for cough and shortness of breath.    Cardiovascular:  Negative for chest pain and palpitations.   Gastrointestinal:  Negative for abdominal pain, nausea and vomiting.   Genitourinary:  Positive for pelvic pain. Negative for flank pain.   Musculoskeletal:  Negative for back pain and myalgias.   Skin:  Negative for  "rash and wound.   Neurological:  Negative for weakness and headaches.   Hematological:  Does not bruise/bleed easily.   Psychiatric/Behavioral:  Negative for sleep disturbance. The patient is not nervous/anxious.    All other systems reviewed and are negative.   as per HPI.  Medical History Reviewed by provider this encounter:  Tobacco  Allergies  Meds  Problems  Med Hx  Surg Hx  Fam Hx     .  Past Medical History   Past Medical History[1]  Past Surgical History[2]  Family History[3]   reports that she quit smoking about 3 years ago. Her smoking use included cigarettes. She started smoking about 17 years ago. She has a 15 pack-year smoking history. She does not have any smokeless tobacco history on file. She reports that she does not currently use alcohol. She reports that she does not use drugs.  Current Outpatient Medications   Medication Instructions    Calcium Carbonate Antacid (TUMS PO) Take by mouth    docusate sodium (COLACE) 50 mg, 4 times daily    EQ Aspirin Adult Low Dose 162 mg, Oral, Daily    levothyroxine 25 mcg, Oral, Daily    Prenat w/o A-FeCbGl-DSS-FA-DHA (PNV OB+DHA PO) Take by mouth   Allergies[4]   Medications Ordered Prior to Encounter[5]   Social History[6]     Objective   /78 (BP Location: Left arm, Patient Position: Sitting, Cuff Size: Large)   Pulse 80   Temp 98.1 °F (36.7 °C) (Tympanic)   Resp 16   Ht 5' 8\" (1.727 m)   Wt 95.3 kg (210 lb)   LMP 10/11/2024 (Exact Date)   SpO2 98%   BMI 31.93 kg/m²      Physical Exam  Vitals and nursing note reviewed.   Constitutional:       General: She is not in acute distress.     Appearance: Normal appearance. She is well-developed. She is not ill-appearing.   HENT:      Head: Normocephalic and atraumatic.      Right Ear: External ear normal.      Left Ear: External ear normal.      Nose: Nose normal.      Mouth/Throat:      Pharynx: Oropharynx is clear.     Eyes:      Extraocular Movements: Extraocular movements intact.      " Conjunctiva/sclera: Conjunctivae normal.       Cardiovascular:      Rate and Rhythm: Normal rate.   Pulmonary:      Effort: Pulmonary effort is normal. No respiratory distress.   Genitourinary:       Comments: Erythematous tender fluctuant abscess    Musculoskeletal:         General: No swelling. Normal range of motion.      Cervical back: Normal range of motion.     Skin:     General: Skin is warm.      Capillary Refill: Capillary refill takes less than 2 seconds.      Findings: No rash.     Neurological:      General: No focal deficit present.      Mental Status: She is alert and oriented to person, place, and time.     Psychiatric:         Mood and Affect: Mood normal.         Thought Content: Thought content normal.                      [1]   Past Medical History:  Diagnosis Date    Abnormal ECG     High school    Difficulty walking 1/17/24    History of chickenpox     as child    History of irregular heartbeat     discovered in high school, w/u negative    History of ovarian cyst    [2]   Past Surgical History:  Procedure Laterality Date    WISDOM TOOTH EXTRACTION     [3]   Family History  Problem Relation Name Age of Onset    Thyroid disease Mother Audrey     Diabetic kidney disease Mother Audrey     Thyroid disease Father Sánchez     Arthritis Father Sánchez     Heart failure Maternal Grandmother      Diabetic kidney disease Maternal Grandmother      Abdominal aortic aneurysm Maternal Grandfather      Aortic aneurysm Maternal Grandfather      Melanoma Paternal Grandmother Anitha     Arthritis Paternal Grandmother Anitha     Cancer Paternal Grandmother Anitha     Heart disease Paternal Grandfather Black     Diabetic kidney disease Maternal Aunt     [4]   Allergies  Allergen Reactions    Bacitracin Dermatitis, Hives, Itching and Rash    Formaldehyde Dermatitis, Hives, Itching and Rash    Methylmethacrylate Dermatitis, Hives, Itching and Rash    Neomycin Dermatitis, Hives, Itching and Rash   [5]   Current Outpatient  Medications on File Prior to Visit   Medication Sig Dispense Refill    Calcium Carbonate Antacid (TUMS PO) Take by mouth      docusate sodium (COLACE) 50 mg capsule Take 50 mg by mouth in the morning and 50 mg at noon and 50 mg in the evening and 50 mg before bedtime. 2 in the morning and 2 in the evening.      EQ Aspirin Adult Low Dose 81 MG EC tablet Take 2 tablets by mouth once daily 90 tablet 0    levothyroxine 25 mcg tablet Take 1 tablet by mouth once daily 30 tablet 5    Prenat w/o A-FeCbGl-DSS-FA-DHA (PNV OB+DHA PO) Take by mouth       No current facility-administered medications on file prior to visit.   [6]   Social History  Tobacco Use    Smoking status: Former     Current packs/day: 0.00     Average packs/day: 1 pack/day for 15.0 years (15.0 ttl pk-yrs)     Types: Cigarettes     Start date: 8/1/2007     Quit date: 2/13/2022     Years since quitting: 3.3   Vaping Use    Vaping status: Never Used   Substance and Sexual Activity    Alcohol use: Not Currently    Drug use: Never    Sexual activity: Yes     Partners: Male

## 2025-06-03 NOTE — TELEPHONE ENCOUNTER
Patient called for new patient appointment for ingrown hair/painful cyst.   Offered soonest appointment.    Patient will seek treatment at another location.

## 2025-06-03 NOTE — TELEPHONE ENCOUNTER
3RD TRIMESTER CHECK-IN CALL      Overall how are you feeling? Pt states she had been feeling well although has recently noted a cyst on her buttock - most likely an ingrown hair.  States she has been in a lot of pain, stated she has been avoiding Tylenol, encouraged her to take as needed.  States she has appointment today w/PCP to have evaluated.  States she called office - care advise given, revived care at this time as well.      Compliant with routine OB appointments? Yes    Have you completed your 3rd trimester lab work? Yes    Have you reviewed the contents of 3rd trimester folder from office?  Yes    Have you decided on a pediatrician? Yes     If yes, who:  [facility]    Questions on paperwork to go back to office? No    Questions on the baby birth certificate forms? No    Sent link for the Hospital Readiness Video via [employer]

## 2025-06-03 NOTE — PROGRESS NOTES
"Incision and Drainage    Date/Time: 6/3/2025 1:30 PM    Performed by: Dada Walters DO  Authorized by: Dada Walters DO    Universal Protocol:  Consent: Verbal consent obtained. Written consent obtained  Risks and benefits: risks, benefits and alternatives were discussed  Consent given by: patient  Time out: Immediately prior to procedure a \"time out\" was called to verify the correct patient, procedure, equipment, support staff and site/side marked as required.  Patient understanding: patient states understanding of the procedure being performed  Patient consent: the patient's understanding of the procedure matches consent given  Procedure consent: procedure consent matches procedure scheduled  Patient identity confirmed: verbally with patient    Patient location:  Bedside  Location:     Type:  Abscess    Location:  Anogenital (Right)    Anogenital location:  Gluteal cleft (Right)  Anesthesia (see MAR for exact dosages):     Anesthesia method:  Local infiltration    Local anesthetic:  Bupivacaine 0.25% WITH epi  Procedure details:     Complexity:  Simple    Needle aspiration: no      Incision types:  Single straight    Scalpel blade:  11    Approach:  Open    Incision depth:  Subcutaneous    Wound management:  Probed and deloculated and irrigated with saline    Drainage:  Purulent    Drainage amount:  Scant    Wound treatment:  Wound left open    Packing materials:  1/2 in gauze  Post-procedure details:     Patient tolerance of procedure:  Tolerated well, no immediate complications      "

## 2025-06-03 NOTE — PROGRESS NOTES
"Name: No Daigle      : 1985      MRN: [id number]  Encounter Provider: Jayde Zayas MD  Encounter Date: 6/3/2025   Encounter department: [facility]    Assessment & Plan  Abscess of right buttock    Orders:  •  Ambulatory Referral to General Surgery; Future         History of Present Illness     Pt is seeing for painful on R buttock started 4 days ago and worsening  -  started to drain this am  -  tried warm compresses       Review of Systems   All other systems reviewed and are negative.    Past Medical History[1]  Past Surgical History[2]  Family History[3]  Social History[4]  Medications[5]  Allergies   Allergen Reactions   • Bacitracin Dermatitis, Hives, Itching and Rash   • Formaldehyde Dermatitis, Hives, Itching and Rash   • Methylmethacrylate Dermatitis, Hives, Itching and Rash   • Neomycin Dermatitis, Hives, Itching and Rash     Immunization History   Administered Date(s) Administered   • Tdap 2025     Objective   /80 (BP Location: Left arm, Patient Position: Sitting, Cuff Size: Standard)   Pulse (!) 116   Temp (!) 97.3 °F (36.3 °C) (Temporal)   Resp 18   Ht 5' 8\" (1.727 m)   Wt 96.2 kg (212 lb)   LMP 10/11/2024 (Exact Date)   SpO2 96%   BMI 32.23 kg/m²     Physical Exam  Constitutional:       General: She is not in acute distress.     Appearance: She is not ill-appearing.   Abdominal:      Comments: Gravid     Skin:     Findings: Abscess (R buttock lower inner quadrant) present.     Neurological:      Mental Status: She is alert.                [1]  Past Medical History:  Diagnosis Date   • Abnormal ECG     High school   • Difficulty walking 25   • History of chickenpox     as child   • History of irregular heartbeat     discovered in high school, w/u negative   • History of ovarian cyst    [2]  Past Surgical History:  Procedure Laterality Date   • WISDOM TOOTH EXTRACTION     [3]  Family History  Problem Relation Name Age of Onset   • Thyroid " disease Mother Audrey    • Diabetic kidney disease Mother Audrey    • Thyroid disease Father Sánchez    • Arthritis Father Sánchez    • Heart failure Maternal Grandmother     • Diabetic kidney disease Maternal Grandmother     • Abdominal aortic aneurysm Maternal Grandfather     • Aortic aneurysm Maternal Grandfather     • Melanoma Paternal Grandmother Anitha    • Arthritis Paternal Grandmother Anitha    • Cancer Paternal Grandmother Anitha    • Heart disease Paternal Grandfather Black    • Diabetic kidney disease Maternal Aunt     [4]  Social History  Tobacco Use   • Smoking status: Former     Current packs/day: 0.00     Average packs/day: 1 pack/day for 15.0 years (15.0 ttl pk-yrs)     Types: Cigarettes     Start date: 8/1/2007     Quit date: 2/13/2022     Years since quitting: 3.3   Vaping Use   • Vaping status: Never Used   Substance and Sexual Activity   • Alcohol use: Not Currently   • Drug use: Never   • Sexual activity: Yes     Partners: Male   [5]  Current Outpatient Medications on File Prior to Visit   Medication Sig   • Calcium Carbonate Antacid (TUMS PO) Take by mouth   • docusate sodium (COLACE) 50 mg capsule Take 50 mg by mouth in the morning and 50 mg at noon and 50 mg in the evening and 50 mg before bedtime. 2 in the morning and 2 in the evening.   • EQ Aspirin Adult Low Dose 81 MG EC tablet Take 2 tablets by mouth once daily   • levothyroxine 25 mcg tablet Take 1 tablet by mouth once daily   • Prenat w/o A-FeCbGl-DSS-FA-DHA (PNV OB+DHA PO) Take by mouth

## 2025-06-05 NOTE — ASSESSMENT & PLAN NOTE
Patient, who is currently almost 34 weeks pregnant, presents for post I&D wound check of right gluteal abscess from 06/03/2025. She removed packing the next day and since then. patient is doing better. She is having no pain in the area with minimal drainage. The area has minimal erythema, no drainage on exam and appears to be healing nicely. Instructed to continue sitz baths, may keep it open to air. Follow up PRN or if worsens or issues arise.

## 2025-06-05 NOTE — PROGRESS NOTES
Name: No Daigle      : 1985      MRN: [id number]  Encounter Provider: JUAN Suero  Encounter Date: 6/10/2025   Encounter department: [facility] GENERAL SURGERY [place]  :  Assessment & Plan  Abscess, gluteal, right  Patient, who is currently almost 34 weeks pregnant, presents for post I&D wound check of right gluteal abscess from 2025. She removed packing the next day and since then. patient is doing better. She is having no pain in the area with minimal drainage. The area has minimal erythema, no drainage on exam and appears to be healing nicely. Instructed to continue sitz baths, may keep it open to air. Follow up PRN or if worsens or issues arise.          History of Present Illness   HPI  No Daigle is a 40 y.o. female who presents after an I&D in office on 25 for wound check.     06/10/2025: Doing well. Removed the packing the next day. Having no pain. Minimal drainage. Been keeping it open to air at night. States it has been a bit itchy which she feels its healing. Denies fever, chills, N&V.     Review of Systems   Constitutional:  Negative for chills and fever.   Eyes:  Negative for pain and visual disturbance.   Respiratory:  Negative for cough and shortness of breath.    Cardiovascular:  Negative for chest pain and palpitations.   Gastrointestinal:  Negative for abdominal pain and vomiting.   Genitourinary:  Negative for dysuria and hematuria.   Musculoskeletal:  Negative for arthralgias and back pain.   Skin:  Negative for color change and rash.   Neurological:  Negative for seizures and syncope.   All other systems reviewed and are negative.         Objective   LMP 10/11/2024 (Exact Date)      Physical Exam  Vitals and nursing note reviewed.   Constitutional:       General: She is not in acute distress.     Appearance: She is well-developed.   HENT:      Head: Normocephalic and atraumatic.     Eyes:      Conjunctiva/sclera: Conjunctivae normal.      Pulmonary:      Effort: Pulmonary effort is normal. No respiratory distress.   Genitourinary:       Comments: Small area with slight remaining erythema. Healing with pinpoint opening with no drainage. No fluctuance. Non tender.    Musculoskeletal:      Cervical back: Neck supple.     Skin:     General: Skin is warm and dry.      Capillary Refill: Capillary refill takes less than 2 seconds.     Neurological:      Mental Status: She is alert and oriented to person, place, and time.     Psychiatric:         Mood and Affect: Mood normal.

## 2025-06-10 ENCOUNTER — OFFICE VISIT (OUTPATIENT)
Dept: SURGERY | Facility: CLINIC | Age: 40
End: 2025-06-10

## 2025-06-10 VITALS
BODY MASS INDEX: 32.1 KG/M2 | RESPIRATION RATE: 16 BRPM | HEIGHT: 68 IN | OXYGEN SATURATION: 98 % | SYSTOLIC BLOOD PRESSURE: 126 MMHG | HEART RATE: 83 BPM | DIASTOLIC BLOOD PRESSURE: 72 MMHG | WEIGHT: 211.8 LBS | TEMPERATURE: 97.7 F

## 2025-06-10 DIAGNOSIS — L02.31 ABSCESS, GLUTEAL, RIGHT: Primary | ICD-10-CM

## 2025-06-10 PROCEDURE — 99024 POSTOP FOLLOW-UP VISIT: CPT

## 2025-06-11 ENCOUNTER — ROUTINE PRENATAL (OUTPATIENT)
Dept: OBGYN CLINIC | Facility: CLINIC | Age: 40
End: 2025-06-11

## 2025-06-11 VITALS
BODY MASS INDEX: 32.08 KG/M2 | HEART RATE: 98 BPM | WEIGHT: 211 LBS | DIASTOLIC BLOOD PRESSURE: 68 MMHG | SYSTOLIC BLOOD PRESSURE: 110 MMHG

## 2025-06-11 DIAGNOSIS — O09.513 PRIMIGRAVIDA OF ADVANCED MATERNAL AGE IN THIRD TRIMESTER: Primary | ICD-10-CM

## 2025-06-11 DIAGNOSIS — O36.8130 DECREASED FETAL MOVEMENTS IN THIRD TRIMESTER, SINGLE OR UNSPECIFIED FETUS: ICD-10-CM

## 2025-06-11 DIAGNOSIS — O99.283 HYPOTHYROIDISM DURING PREGNANCY IN THIRD TRIMESTER: ICD-10-CM

## 2025-06-11 DIAGNOSIS — E03.9 HYPOTHYROIDISM DURING PREGNANCY IN THIRD TRIMESTER: ICD-10-CM

## 2025-06-11 PROCEDURE — PNV: Performed by: NURSE PRACTITIONER

## 2025-06-11 NOTE — PROGRESS NOTES
"  OB/GYN  PN Visit  No Daigle  [id number]  2025  12:30 PM   JUAN Allen    S: No Daigle 40 y.o.  34w5d here for PN visit. She denies contractions. She has experienced xavi em. She denies leakage of fluid and vaginal bleeding. She is concerned about decrease fetal movement. States last night she was able to count 10 movements, but since last night she has only had a few moments. She denies nausea, vomiting, headache, cramping, edema, domestic violence, and smoking, ETOH or drug. Her pregnancy is complicated by AMA, hypothyroidism.    O:    Pre-Marcellus Vitals      Flowsheet Row Most Recent Value   Prenatal Assessment    Fetal Heart Rate 135   Fundal Height (cm) 34 cm   Movement Present   Prenatal Vitals    Blood Pressure 110/68   Weight - Scale 95.7 kg (211 lb)   Urine Albumin/Glucose    Dilation/Effacement/Station    Vaginal Drainage    Edema           NST 1200 - 1230: reassuring and reactive. See NST note    Urine: tr/-    A/P:    1. 34w5d GESTATION  - Continue PNV  - Labor precautions reviewed  - Fetal kick counts reviewed  - Labs: UTD  - Genetics: NIPTs and WAYNE  - Ultrasounds: 25 -  EFW 1,950g; 4lbs 5oz (59% )                  - Tdap: 25  - Rhogam: O Pos  - Delivery:   - Contraception: TBD  - Infant feeding: breastfeeding; Pump ordered  - Pediatrician: TBD - considering Dr. Reece    - RTO in 2 weeks or sooner if needed    USE THIS FOR OVERVIEW? (Can be edited in the \"Problem list\"  Problem List          Endocrine    Hypothyroidism during pregnancy in first trimester       Obstetrics/Gynecology    12 weeks gestation of pregnancy    Primigravida of advanced maternal age in third trimester    Overview   - Continue PNV  - Call precautions reviewed  - Fetal kick counts reviewed today  - Ultrasounds: 25 - anatomy survey normal, third tri growth wnl EFW 59%  - Genetics cfDNA wnl, AFP wnl  - Labs: UTD               RH Status: POS               First Tri:completed          " "     Third Tri: up to date   - Tdap: administered  - Flu Shot: declined  - COVID: vaccinated  - Delivery:  with epidural.  - Contraception: TBD  - Feeding: pump ordered   - Pediatrician: meeting with Chevy also considering   - RTO in 2 weeks or sooner if needed.            Other    Abscess, gluteal, right       USE THIS FOR \"DAN\" CHARTING (can be edited in the note)  Assessment & Plan  Primigravida of advanced maternal age in third trimester         Hypothyroidism during pregnancy in third trimester         Decreased fetal movements in third trimester, single or unspecified fetus  NST reactive and reassuring in office.            Future Appointments   Date Time Provider Department Center   2025 11:30 AM JUAN Allen  [facility]   2025 11:45 AM JUAN Allen [facility]   7/10/2025 11:15 AM Ann Bradford MD [facility]   2025 11:30 AM Malgorzata Estrada MD [facility]   2025  1:00 PM POST PARTUM RECOVERY CLASS BE BABY AND ME [facility]   2025  8:20 AM Estefani Bella MD [facility]   10/3/2025 10:20 AM Tasneem Bella MD [facility]         JUAN Allen  2025  12:30 PM              "

## 2025-06-21 ENCOUNTER — NURSE TRIAGE (OUTPATIENT)
Dept: OBGYN CLINIC | Facility: CLINIC | Age: 40
End: 2025-06-21

## 2025-06-21 DIAGNOSIS — L29.9 PRURITUS OF PREGNANCY IN THIRD TRIMESTER: Primary | ICD-10-CM

## 2025-06-21 DIAGNOSIS — O99.713 PRURITUS OF PREGNANCY IN THIRD TRIMESTER: Primary | ICD-10-CM

## 2025-06-23 NOTE — TELEPHONE ENCOUNTER
"REASON FOR CONVERSATION: Pregnancy Problem    SYMPTOMS: hands and feet itching- mostly at night    OTHER HEALTH INFORMATION: G1@36w3d reporting itching on her hands and feet, mostly at night.  Reports it was pretty severe Friday night.  Denies dec FM and OB complaints.  She reports she made and appointment at [facility] today to get blood work done and needs orders.    PROTOCOL DISPOSITION: Call PCP Within 24 Hours    CARE ADVICE PROVIDED: Benadryl for itching, monitor and call back with dec FM, new or worsening symptoms    PRACTICE FOLLOW-UP: [facility] provider on call, Dr. Gomez- will place lab orders    Reason for Disposition   Hand or foot itching    Answer Assessment - Initial Assessment Questions  1. DESCRIPTION: \"Describe the itching you are having.\"      Palms and feet itching  2. LOCATION: \"Where is the itching located?\" (abdomen, feet, hands, or everywhere equally)      Palms and feet  3. SEVERITY: \"How bad is it?\"       Mostly at night- hands and feet, had trouble sleeping  4. SCRATCHING: \"Are there any scratch marks? Bleeding?\"      denies  5. ONSET: \"When did this begin?\"       Fri night into sat  6. CAUSE: \"What do you think is causing the itching?\" (ask about swimming pools, pollen, animals, soaps, etc.)      Unsure- cholestasis  7. OTHER SYMPTOMS: \"Do you have any other symptoms?\" (abdominal pain, dark urine, fever, light colored stool, poor appetite, rash, or weight loss)      Normal FM per pt  8. PREGNANCY: \"How many weeks pregnant are you?\" \"Are you being monitored for a high-risk pregnancy?\"      AMA  9. CLAUDE: \"What date are you expecting to deliver?\"      7/18/25    Protocols used: Pregnancy - Itching-Adult-AH    "

## 2025-06-24 ENCOUNTER — ROUTINE PRENATAL (OUTPATIENT)
Dept: OBGYN CLINIC | Facility: CLINIC | Age: 40
End: 2025-06-24

## 2025-06-24 VITALS — BODY MASS INDEX: 32.87 KG/M2 | DIASTOLIC BLOOD PRESSURE: 80 MMHG | WEIGHT: 216.2 LBS | SYSTOLIC BLOOD PRESSURE: 116 MMHG

## 2025-06-24 DIAGNOSIS — O09.513 PRIMIGRAVIDA OF ADVANCED MATERNAL AGE IN THIRD TRIMESTER: Primary | ICD-10-CM

## 2025-06-24 DIAGNOSIS — Z3A.36 36 WEEKS GESTATION OF PREGNANCY: ICD-10-CM

## 2025-06-24 LAB
ALBUMIN SERPL-MCNC: 3.6 G/DL (ref 3.9–4.9)
ALP SERPL-CCNC: 132 IU/L (ref 44–121)
ALT SERPL-CCNC: 23 IU/L (ref 0–32)
AST SERPL-CCNC: 32 IU/L (ref 0–40)
BASOPHILS # BLD AUTO: 0 X10E3/UL (ref 0–0.2)
BASOPHILS NFR BLD AUTO: 1 %
BILE AC SERPL-SCNC: 2.9 UMOL/L (ref 0–10)
BILIRUB SERPL-MCNC: <0.2 MG/DL (ref 0–1.2)
BUN SERPL-MCNC: 11 MG/DL (ref 6–20)
BUN/CREAT SERPL: 14 (ref 9–23)
CALCIUM SERPL-MCNC: 10.1 MG/DL (ref 8.7–10.2)
CHLORIDE SERPL-SCNC: 101 MMOL/L (ref 96–106)
CO2 SERPL-SCNC: 18 MMOL/L (ref 20–29)
CREAT SERPL-MCNC: 0.78 MG/DL (ref 0.57–1)
EGFR: 99 ML/MIN/1.73
EOSINOPHIL # BLD AUTO: 0.1 X10E3/UL (ref 0–0.4)
EOSINOPHIL NFR BLD AUTO: 1 %
ERYTHROCYTE [DISTWIDTH] IN BLOOD BY AUTOMATED COUNT: 12.7 % (ref 11.7–15.4)
GLOBULIN SER-MCNC: 2.3 G/DL (ref 1.5–4.5)
GLUCOSE SERPL-MCNC: 82 MG/DL (ref 70–99)
HCT VFR BLD AUTO: 35.3 % (ref 34–46.6)
HGB BLD-MCNC: 11.6 G/DL (ref 11.1–15.9)
IMM GRANULOCYTES # BLD: 0.1 X10E3/UL (ref 0–0.1)
IMM GRANULOCYTES NFR BLD: 1 %
LYMPHOCYTES # BLD AUTO: 1.2 X10E3/UL (ref 0.7–3.1)
LYMPHOCYTES NFR BLD AUTO: 14 %
MCH RBC QN AUTO: 29.8 PG (ref 26.6–33)
MCHC RBC AUTO-ENTMCNC: 32.9 G/DL (ref 31.5–35.7)
MCV RBC AUTO: 91 FL (ref 79–97)
MONOCYTES # BLD AUTO: 0.6 X10E3/UL (ref 0.1–0.9)
MONOCYTES NFR BLD AUTO: 7 %
NEUTROPHILS # BLD AUTO: 6.6 X10E3/UL (ref 1.4–7)
NEUTROPHILS NFR BLD AUTO: 76 %
PLATELET # BLD AUTO: 156 X10E3/UL (ref 150–450)
POTASSIUM SERPL-SCNC: 4.6 MMOL/L (ref 3.5–5.2)
PROT SERPL-MCNC: 5.9 G/DL (ref 6–8.5)
RBC # BLD AUTO: 3.89 X10E6/UL (ref 3.77–5.28)
SODIUM SERPL-SCNC: 135 MMOL/L (ref 134–144)
WBC # BLD AUTO: 8.6 X10E3/UL (ref 3.4–10.8)

## 2025-06-24 PROCEDURE — PNV: Performed by: NURSE PRACTITIONER

## 2025-06-24 NOTE — PROGRESS NOTES
"  OB/GYN  PN Visit  No Daigle  [id number]  2025  11:32 AM   JUAN Allen    S: No Daigle 40 y.o.  36w4d here for PN visit. She no contractions. She no leakage of fluid and vaginal bleeding. She has good fetal movement. She denies nausea, vomiting,, headache, cramping, and smoking, ETOH or drug. Swelling in hands and feet. Her pregnancy is complicated by AMA, hypothyroidism.     O:    Pre- Vitals      Flowsheet Row Most Recent Value   Prenatal Assessment    Fetal Heart Rate 140   Fundal Height (cm) 38 cm   Prenatal Vitals    Blood Pressure 116/80   Weight - Scale 98.1 kg (216 lb 3.2 oz)   Urine Albumin/Glucose    Dilation/Effacement/Station    Vaginal Drainage    Edema             A/P:    1. 36w4d GESTATION  - Continue PNV  - Labor precautions reviewed  - Fetal kick counts reviewed  - Labs: UTD. Labs pending for cholestasis   GBS collected.  - Genetics: NIPTs and WAYNE  - Ultrasounds: 25 -  EFW 1,950g; 4lbs 5oz (59%)      - Tdap: 25  - Rhogam: O pos  - Delivery:   - Contraception: TBD  - Infant feeding: breastfeeding; Pump pump arrived  - Pediatrician: Dr. Reece    - RTO in 1 week or sooner if needed    USE THIS FOR OVERVIEW? (Can be edited in the \"Problem list\"  Problem List          Endocrine    Hypothyroidism during pregnancy in first trimester       Obstetrics/Gynecology    12 weeks gestation of pregnancy    Primigravida of advanced maternal age in third trimester    Overview   - Continue PNV  - Call precautions reviewed  - Fetal kick counts reviewed today  - Ultrasounds: 25 - anatomy survey normal, third tri growth wnl EFW 59%  - Genetics cfDNA wnl, AFP wnl  - Labs: UTD               RH Status: POS               First Tri:completed               Third Tri: up to date   - Tdap: administered  - Flu Shot: declined  - COVID: vaccinated  - Delivery:  with epidural.  - Contraception: TBD  - Feeding: pump ordered   - Pediatrician: meeting with Chevy also considering " "  - RTO in 2 weeks or sooner if needed.            Other    Abscess, gluteal, right       USE THIS FOR \"DAN\" CHARTING (can be edited in the note)  Assessment & Plan  Primigravida of advanced maternal age in third trimester    Orders:    Strep B DNA probe, amplification    36 weeks gestation of pregnancy    Orders:    Strep B DNA probe, amplification        Future Appointments   Date Time Provider Department Center   7/2/2025 11:45 AM JUAN Allen [facility]   7/10/2025 11:15 AM Ann Bradford MD [facility]   7/14/2025 11:30 AM Malgorzata Estrada MD [facility] [place]   8/14/2025  1:00 PM POST PARTUM RECOVERY CLASS [facility] Practice-[facility]   9/12/2025  8:20 AM Estefani Bella MD [facility]   10/3/2025 10:20 AM Tasneem Bella MD [facility]         JUAN Allen  6/24/2025  11:32 AM              "

## 2025-06-25 ENCOUNTER — TELEPHONE (OUTPATIENT)
Age: 40
End: 2025-06-25

## 2025-06-25 NOTE — TELEPHONE ENCOUNTER
Patient called office for CMP, bile acid, cbc results to be reviewed. Advised will typically be reviewed within 72 hours. Patient states she is concerned with abnormal values of CMP and would like it to be reviewed sooner. Informed her Will reach out to provider at this time.

## 2025-06-26 ENCOUNTER — HOSPITAL ENCOUNTER (OUTPATIENT)
Facility: HOSPITAL | Age: 40
Discharge: HOME | End: 2025-06-26
Attending: STUDENT IN AN ORGANIZED HEALTH CARE EDUCATION/TRAINING PROGRAM | Admitting: STUDENT IN AN ORGANIZED HEALTH CARE EDUCATION/TRAINING PROGRAM
Payer: COMMERCIAL

## 2025-06-26 ENCOUNTER — NURSE TRIAGE (OUTPATIENT)
Age: 40
End: 2025-06-26

## 2025-06-26 ENCOUNTER — RESULTS FOLLOW-UP (OUTPATIENT)
Dept: OBGYN CLINIC | Facility: CLINIC | Age: 40
End: 2025-06-26

## 2025-06-26 VITALS
RESPIRATION RATE: 18 BRPM | WEIGHT: 216 LBS | BODY MASS INDEX: 32.74 KG/M2 | SYSTOLIC BLOOD PRESSURE: 132 MMHG | DIASTOLIC BLOOD PRESSURE: 87 MMHG | TEMPERATURE: 98.1 F | HEART RATE: 101 BPM | HEIGHT: 68 IN

## 2025-06-26 PROBLEM — O36.8190 DECREASED FETAL MOVEMENT AFFECTING MANAGEMENT OF MOTHER, ANTEPARTUM: Status: ACTIVE | Noted: 2025-06-26

## 2025-06-26 LAB — GP B STREP DNA SPEC QL NAA+PROBE: NEGATIVE

## 2025-06-26 PROCEDURE — 76815 OB US LIMITED FETUS(S): CPT

## 2025-06-26 PROCEDURE — 99214 OFFICE O/P EST MOD 30 MIN: CPT

## 2025-06-26 PROCEDURE — 76815 OB US LIMITED FETUS(S): CPT | Performed by: STUDENT IN AN ORGANIZED HEALTH CARE EDUCATION/TRAINING PROGRAM

## 2025-06-26 PROCEDURE — NC001 PR NO CHARGE: Performed by: STUDENT IN AN ORGANIZED HEALTH CARE EDUCATION/TRAINING PROGRAM

## 2025-06-26 NOTE — TELEPHONE ENCOUNTER
"REASON FOR CONVERSATION: Decreased Fetal Movement    SYMPTOMS: 36 weeks 6 days EDC 7/18/25. Decreased fetal movements today, usually does kick count at night. C/o uterus feeling harder today and heavy, no vaginal bleeding, no lof. Denies rhythmic pain or contractions, just pelvic pressure and more back pain today. No nv/d. No fever,  OTHER HEALTH INFORMATION: 6/24 last OB visit     PROTOCOL DISPOSITION: Go to  Now (Or to Office With PCP Approval)    CARE ADVICE PROVIDED: advised due to decreased fetal movement and pain to go to  L&D [place], patient does not want to travel form [place] to [place]. Patient sked what else could she do: Advised eat protein, increase cold water, walk around and lie down check for kick counting. Call office back, reviewed criteria when to call back. Also advised I will update the on call Dr. thrasher and call her back. Patient verbalzied understanding.    PRACTICE FOLLOW-UP: [facility] on call Dr. Gomez- patient to call back if not meeting kick counts.   3:40 phone call to patient again and reviewed providers recommendations. Patient verbalzied understanding and will eat something now and call office back.       Reason for Disposition   Pregnant 23 or more weeks and baby moving less today by kick count (e.g., kick count < 5 in 1 hour or < 10 in 2 hours)    Answer Assessment - Initial Assessment Questions  1. FETAL MOVEMENT: \"Has the baby's movement decreased or changed significantly from normal?\" (e.g., yes, no; describe)      Less fetal movements today, was up a lot last night. Had breakfast but did not eat lunch yet. Usually does kick count at night.   2. CLAUDE: \"What date are you expecting to deliver?\"       7/18/25   3. PREGNANCY: \"How many weeks pregnant are you?\"       36 weeks 6 days   4. OTHER SYMPTOMS: \"Do you have any other symptoms?\" (e.g., abdominal pain, leaking fluid from vagina, vaginal bleeding, etc.)      Denies lof, no vaginal bleeding,uterus feels hard and heavy but denies " contractions. Low back pain is new, feels more pelvic pressure today.    Protocols used: Pregnancy - Decreased Fetal Movement-ADULT-[place]

## 2025-06-27 NOTE — PROCEDURES
No Daigle, a  at 37w0d with an CLAUDE of 2025, by Last Menstrual Period, was seen at [facility] DELIVERY for the following procedure(s): $Procedure Type: WAYNE]      4 Quadrant WAYNE  WAYNE Q1 (cm): 5.2 cm  WAYNE Q2 (cm): 3.1 cm  WAYNE Q3 (cm): 2.7 cm  WAYNE Q4 (cm): 2.5 cm  WAYNE TOTAL (cm): 13.5 cm              Hari Randolph MD  Obstetrics & Gynecology PGY-1  2025  7:31 AM

## 2025-07-02 ENCOUNTER — ROUTINE PRENATAL (OUTPATIENT)
Dept: OBGYN CLINIC | Facility: CLINIC | Age: 40
End: 2025-07-02

## 2025-07-02 VITALS — BODY MASS INDEX: 33.33 KG/M2 | WEIGHT: 219.2 LBS | SYSTOLIC BLOOD PRESSURE: 136 MMHG | DIASTOLIC BLOOD PRESSURE: 84 MMHG

## 2025-07-02 DIAGNOSIS — O09.513 PRIMIGRAVIDA OF ADVANCED MATERNAL AGE IN THIRD TRIMESTER: Primary | ICD-10-CM

## 2025-07-02 PROCEDURE — PNV: Performed by: NURSE PRACTITIONER

## 2025-07-02 NOTE — PROGRESS NOTES
"  OB/GYN  PN Visit  No Daigle  [id number]  2025  11:45 AM   JUAN Allen    S: No Daigle 40 y.o.  37w5d here for PN visit. She denies contractions. She is experiencing cramping. She denies leakage of fluid and vaginal bleeding. She reports good fetal movement. She denies nausea, vomiting, headache, edema, domestic violence, and smoking, ETOH or drug. Her pregnancy is complicated by AMA, subclinical hypothyroidism.     O:    Pre- Vitals      Flowsheet Row Most Recent Value   Prenatal Assessment    Fetal Heart Rate 150   Fundal Height (cm) 38 cm   Movement Present   Prenatal Vitals    Blood Pressure 136/84   Weight - Scale 99.4 kg (219 lb 3.2 oz)   Urine Albumin/Glucose    Dilation/Effacement/Station    Vaginal Drainage    Edema             A/P:    1. 37w5d GESTATION  - Continue PNV  - Labor precautions reviewed  - Fetal kick counts reviewed  - Labs: UTD. Labs pending for cholestasis              GBS collected.  - Genetics: NIPTs and WAYNE  - Ultrasounds: 25 -  EFW 1,950g; 4lbs 5oz (59%)      - Tdap: 25  - Rhogam: O pos  - Delivery:   - Contraception: TBD  - Infant feeding: breastfeeding; Pump pump arrived  - Pediatrician: Dr. Reece     - RTO in 1 week or sooner if needed    USE THIS FOR OVERVIEW? (Can be edited in the \"Problem list\"  Problem List          Endocrine    Hypothyroidism during pregnancy in first trimester       Other Pediatrics    Decreased fetal movement affecting management of mother, antepartum       Obstetrics/Gynecology    12 weeks gestation of pregnancy    Primigravida of advanced maternal age in third trimester    Overview   - Continue PNV  - Call precautions reviewed  - Fetal kick counts reviewed today  - Ultrasounds: 25 - anatomy survey normal, third tri growth wnl EFW 59%  - Genetics cfDNA wnl, AFP wnl  - Labs: UTD               RH Status: POS               First Tri:completed               Third Tri: up to date   - Tdap: administered  - Flu Shot: " "declined  - COVID: vaccinated  - Delivery:  with epidural.  - Contraception: TBD  - Feeding: pump ordered   - Pediatrician: meeting with Chevy also considering   - RTO in 2 weeks or sooner if needed.            Other    Abscess, gluteal, right       USE THIS FOR \"DAN\" CHARTING (can be edited in the note)  Assessment & Plan  Primigravida of advanced maternal age in third trimester             Future Appointments   Date Time Provider Department Center   7/10/2025 11:15 AM Ann Bradford MD [facility]   2025 11:30 AM Malgorzata Estrada MD [facility]   2025  1:00 PM POST PARTUM RECOVERY CLASS [facility] ME [facility]   2025  8:20 AM Estefani Bella MD [facility]   10/3/2025 10:20 AM Tasneem Bella MD [facility]  2025  11:45 AM              "

## 2025-07-03 ENCOUNTER — NURSE TRIAGE (OUTPATIENT)
Age: 40
End: 2025-07-03

## 2025-07-03 NOTE — TELEPHONE ENCOUNTER
"REASON FOR CONVERSATION: Pregnancy Problem    SYMPTOMS: abdominal pain    OTHER HEALTH INFORMATION: 37w6d---Patient complains of sore feeling abdomen along right lower side. Currently 4/10 Pain worse when changing positions in bed. Denies cramps/ctxns, LOF or vaginal bleeding. Endorses FM.  ESC to provider to update.     PROTOCOL DISPOSITION: Home Care    CARE ADVICE PROVIDED: Advised rest, tylenol, hydration, gentle stretches, warm bath. Reviewed s/s of labor and warning signs with patient. Verbalized understanding.    PRACTICE FOLLOW-UP: n/a      Reason for Disposition   Round ligament pain (previously diagnosed by physician), questions about    Answer Assessment - Initial Assessment Questions  1. LOCATION: \"Where does it hurt?\"       Right lower quadrant   2. RADIATION: \"Does the pain shoot anywhere else?\" (e.g., chest, back)      Around back  3. ONSET: \"When did the pain begin?\" (Minutes, hours or days ago)       2 days ago  4. SUDDEN: \"Gradual or sudden onset?\"      gradual  5. PATTERN: \"Does the pain come and go, or has it been constant since it started?\"       constant  6. SEVERITY: \"How bad is the pain?\" \"What does it keep you from doing?\"  (e.g., Scale 1-10; mild, moderate, or severe)      4/10  7. RECURRENT SYMPTOM: \"Have you ever had this type of stomach pain before?\" If Yes, ask: \"When was the last time?\" and \"What happened that time?\"       Night before--nothing  8. CAUSE: \"What do you think is causing the stomach pain?      unsure  9. RELIEVING/AGGRAVATING FACTORS: \"What makes it better or worse?\" (e.g., antacids, bowel movement, movement)      Side to side movement when turning--worsens  10. FETAL MOVEMENT: \"Has the baby's movement decreased or changed significantly from normal?\"        denies  11. OTHER SYMPTOMS: \"Do you have any other symptoms?\" (e.g., back pain, contractions, diarrhea, fever, headache, urination pain, vaginal bleeding, vaginal discharge, vomiting)        denies  12. CLAUDE: \"What " "date are you expecting to deliver?\"        7/18    Protocols used: Pregnancy - Abdominal Pain Greater Than 20 Weeks EGA-Adult-[place]    "

## 2025-07-10 ENCOUNTER — ROUTINE PRENATAL (OUTPATIENT)
Dept: OBGYN CLINIC | Facility: CLINIC | Age: 40
End: 2025-07-10

## 2025-07-10 VITALS — BODY MASS INDEX: 32.75 KG/M2 | WEIGHT: 215.4 LBS | DIASTOLIC BLOOD PRESSURE: 80 MMHG | SYSTOLIC BLOOD PRESSURE: 130 MMHG

## 2025-07-10 DIAGNOSIS — Z3A.38 38 WEEKS GESTATION OF PREGNANCY: ICD-10-CM

## 2025-07-10 DIAGNOSIS — O99.283 HYPOTHYROIDISM DURING PREGNANCY IN THIRD TRIMESTER: Primary | ICD-10-CM

## 2025-07-10 DIAGNOSIS — O09.513 PRIMIGRAVIDA OF ADVANCED MATERNAL AGE IN THIRD TRIMESTER: ICD-10-CM

## 2025-07-10 DIAGNOSIS — E03.9 HYPOTHYROIDISM DURING PREGNANCY IN THIRD TRIMESTER: Primary | ICD-10-CM

## 2025-07-10 PROCEDURE — PNV: Performed by: OBSTETRICS & GYNECOLOGY

## 2025-07-10 RX ORDER — ACETAMINOPHEN 500 MG
500 TABLET ORAL EVERY 6 HOURS PRN
COMMUNITY
End: 2025-07-20

## 2025-07-10 NOTE — PROGRESS NOTES
OB/GYN  PN Visit  No Daigle  [id number]  7/10/2025  12:22 PM  Dr. Ann Christiansen MD    S: 40 y.o.  38w6d here for PN visit. She reports cramping but denies contractions. She denies leakage of fluid and vaginal bleeding. She reports good fetal movement. She denies nausea, vomiting, headache, domestic violence, and smoking. She reports edema but it is better than last week. Her pregnancy is complicated by AMA, subclinical hypothyroidism.         O:  Pre- Vitals      Flowsheet Row Most Recent Value   Prenatal Assessment    Fetal Heart Rate 140s   Fundal Height (cm) 39 cm   Movement Present   Prenatal Vitals    Blood Pressure 130/80   Weight - Scale 97.7 kg (215 lb 6.4 oz)   Urine Albumin/Glucose    Dilation/Effacement/Station    Vaginal Drainage    Draining Fluid No   Edema    LLE Edema Trace   RLE Edema Trace          Physical Exam  Vitals reviewed.   Constitutional:       General: She is not in acute distress.     Appearance: Normal appearance. She is well-developed. She is not ill-appearing, toxic-appearing or diaphoretic.     Cardiovascular:      Rate and Rhythm: Normal rate.   Pulmonary:      Effort: Pulmonary effort is normal. No respiratory distress.   Abdominal:      General: There is no distension.      Palpations: Abdomen is soft. There is no mass.      Tenderness: There is no abdominal tenderness. There is no guarding or rebound.   Genitourinary:     Comments: Gravid, nontender    Skin:     General: Skin is warm and dry.     Neurological:      Mental Status: She is alert and oriented to person, place, and time.     Psychiatric:         Mood and Affect: Mood normal.         Behavior: Behavior normal.           Assessment & Plan  38 weeks gestation of pregnancy  - Continue PNV  - Labor precautions reviewed  - Fetal kick counts reviewed  - Labs: UTD; Bile acids WNL  - Genetics cfDNA wnl, AFP wnl   - Ultrasounds: Ultrasounds: 25 - anatomy survey normal, third tri growth wnl EFW 59%   -  Tdap: Done  - Flu Shot: Declined  - RSV:  Will offer during season (-) @ [id number]   - COVID: Vaccinated; declined booster  - Rhogam: N/A  - Delivery:  with epidural  - Contraception: Undecided; was on seasonique previously ; probs POPs  - Infant feeding: Breast; Pump arrived  - Pediatrician: Dr. Reece  - GBS negative  - Labor talk done  - IOL: Discussed in detail today; currently declines  - RTO in 1 week       Hypothyroidism during pregnancy in third trimester  Continue levothyroxine  TSH WNL 3/2025       Primigravida of advanced maternal age in third trimester  S/p ASA           Future Appointments   Date Time Provider Department Center   2025 11:30 AM Malgorzata Estrada MD [facility]   2025  1:00 PM POST PARTUM RECOVERY CLASS [facility]   2025  8:20 AM Estefani Bella MD [facility]   10/3/2025 10:20 AM Tasneem Bella MD [facility]-[place]         Ann Christiansen MD  2025  12:22 PM

## 2025-07-10 NOTE — ASSESSMENT & PLAN NOTE
- Continue PNV  - Labor precautions reviewed  - Fetal kick counts reviewed  - Labs: UTD; Bile acids WNL  - Genetics cfDNA wnl, AFP wnl   - Ultrasounds: Ultrasounds: 25 - anatomy survey normal, third tri growth wnl EFW 59%   - Tdap: Done  - Flu Shot: Declined  - RSV:  Will offer during season (-) @ [id number]   - COVID: Vaccinated; declined booster  - Rhogam: N/A  - Delivery:  with epidural  - Contraception: Undecided; was on seasonique previously ; probs POPs  - Infant feeding: Breast; Pump arrived  - Pediatrician: Dr. Reece  - GBS negative  - Labor talk done  - IOL: Discussed in detail today; currently declines  - RTO in 1 week

## 2025-07-14 ENCOUNTER — ROUTINE PRENATAL (OUTPATIENT)
Dept: OBGYN CLINIC | Facility: CLINIC | Age: 40
End: 2025-07-14
Payer: COMMERCIAL

## 2025-07-14 ENCOUNTER — TELEPHONE (OUTPATIENT)
Dept: OBGYN CLINIC | Facility: CLINIC | Age: 40
End: 2025-07-14

## 2025-07-14 VITALS
SYSTOLIC BLOOD PRESSURE: 136 MMHG | HEART RATE: 109 BPM | HEIGHT: 68 IN | BODY MASS INDEX: 32.58 KG/M2 | WEIGHT: 215 LBS | DIASTOLIC BLOOD PRESSURE: 86 MMHG | OXYGEN SATURATION: 97 %

## 2025-07-14 DIAGNOSIS — O36.8190 DECREASED FETAL MOVEMENT AFFECTING MANAGEMENT OF PREGNANCY, ANTEPARTUM, SINGLE OR UNSPECIFIED FETUS: Primary | ICD-10-CM

## 2025-07-14 DIAGNOSIS — O09.513 PRIMIGRAVIDA OF ADVANCED MATERNAL AGE IN THIRD TRIMESTER: ICD-10-CM

## 2025-07-14 PROCEDURE — 59025 FETAL NON-STRESS TEST: CPT | Performed by: STUDENT IN AN ORGANIZED HEALTH CARE EDUCATION/TRAINING PROGRAM

## 2025-07-14 PROCEDURE — 76815 OB US LIMITED FETUS(S): CPT | Performed by: STUDENT IN AN ORGANIZED HEALTH CARE EDUCATION/TRAINING PROGRAM

## 2025-07-14 PROCEDURE — PNV: Performed by: STUDENT IN AN ORGANIZED HEALTH CARE EDUCATION/TRAINING PROGRAM

## 2025-07-14 NOTE — TELEPHONE ENCOUNTER
----- Message from Malgorzata Estrada MD sent at 7/14/2025 12:54 PM EDT -----  Regarding: Ivana  Please help No schedule elective induction of labor by 40 weeks gestation-she would be an a.m. induction spot for Pitocin.    Thank you,  Malgorzata Estrada MD  [facility]

## 2025-07-14 NOTE — PROGRESS NOTES
OB/GYN  PN Visit  No Daigle  [id number]  2025  11:34 AM  Malgorzata Estrada MD    S: 40 y.o.  39w3d here for PN visit-reports irregular cramping and contractions.  Denies any loss of fluid or vaginal bleeding.  She does report regular fetal movement but overall does note that it has decreased in these last couple of weeks.  Patient amenable to staying for nonstress test and WAYNE today.      O:  Vitals:    25 1100   BP: 136/86       NST reactive with a baseline in 150s, moderate variability and multiple 15 x 15 accelerations noted.  No decelerations seen.  There was not an area of broken strip with possible variable deceleration but patient reports she was moving monitor at that time.  The entirety of  the 40 minute FHT had moderate variability with multiple spontaneous accelerations and therefore reactive.  Vertex presentation  WAYNE 13.47 cm    A/P:  #1. 39w3d GESTATION  - Continue PNV  - Labor precautions reviewed  - Fetal kick counts reviewed  - Labs: UTD; Bile acids WNL  - Genetics cfDNA wnl, AFP wnl   - Ultrasounds: Ultrasounds: 25 - anatomy survey normal, third tri growth wnl EFW 59%   - Tdap: Done  - Flu Shot: Declined  - RSV:  Will offer during season (-) @ [id number]   - COVID: Vaccinated; declined booster  - Rhogam: N/A  - Delivery:  with epidural-reviewed recommendations for induction of labor given 39 weeks and perception of decreased fetal movement.  Patient would prefer spontaneous labor but would be okay with delivery at her due date if not yet in labor.  We did review reactive NST and appropriate WAYNE today on exam.  We reviewed strict fetal kick counts and if having decreased fetal movement again that I would strongly advise delivery at that time.  Patient declined membrane sweep today in office.  This task sent to schedule elective induction of labor.  - Contraception: Undecided; was on seasonique previously ; probs POPs  - Infant feeding: Breast; Pump  arrived  - Pediatrician: Dr. Reece  - GBS negative  - Labor talk done  - IOL: Discussed in detail today; currently declines  - RTO in 1 week    Malgorzata Estrada MD  7/14/2025  11:34 AM

## 2025-07-14 NOTE — TELEPHONE ENCOUNTER
ESC message sent to Herminio S @ [facility] L&D to check availability for 1 day Pit EIOL on 7/18/2025 which is patient's preference & her EDC.  Also having NST/WAYNE today for decreased FM - awaiting response.

## 2025-07-15 ENCOUNTER — TELEPHONE (OUTPATIENT)
Dept: OBGYN CLINIC | Facility: CLINIC | Age: 40
End: 2025-07-15

## 2025-07-16 NOTE — TELEPHONE ENCOUNTER
Staff message sent to [facility]3] to schedule OB appointment for patient prior to EIOL on 7/25/2025 since last OB appointment was 7/14/2025.

## 2025-07-17 ENCOUNTER — CLINICAL DOCUMENTATION ONLY (OUTPATIENT)
Dept: NURSERY | Facility: HOSPITAL | Age: 40
End: 2025-07-17

## 2025-07-17 ENCOUNTER — NURSE TRIAGE (OUTPATIENT)
Dept: OTHER | Facility: OTHER | Age: 40
End: 2025-07-17

## 2025-07-17 ENCOUNTER — HOSPITAL ENCOUNTER (INPATIENT)
Facility: HOSPITAL | Age: 40
LOS: 3 days | Discharge: HOME | End: 2025-07-20
Attending: STUDENT IN AN ORGANIZED HEALTH CARE EDUCATION/TRAINING PROGRAM | Admitting: STUDENT IN AN ORGANIZED HEALTH CARE EDUCATION/TRAINING PROGRAM
Payer: COMMERCIAL

## 2025-07-17 ENCOUNTER — ANESTHESIA (INPATIENT)
Dept: ANESTHESIOLOGY | Facility: HOSPITAL | Age: 40
End: 2025-07-17
Payer: COMMERCIAL

## 2025-07-17 ENCOUNTER — ANESTHESIA EVENT (INPATIENT)
Dept: ANESTHESIOLOGY | Facility: HOSPITAL | Age: 40
End: 2025-07-17
Payer: COMMERCIAL

## 2025-07-17 DIAGNOSIS — O13.3 GESTATIONAL HYPERTENSION, THIRD TRIMESTER: ICD-10-CM

## 2025-07-17 PROBLEM — R03.0 ELEVATED BP WITHOUT DIAGNOSIS OF HYPERTENSION: Status: ACTIVE | Noted: 2025-07-17

## 2025-07-17 PROBLEM — O42.90 LEAKAGE OF AMNIOTIC FLUID: Status: ACTIVE | Noted: 2025-07-17

## 2025-07-17 PROBLEM — Z3A.39 39 WEEKS GESTATION OF PREGNANCY: Status: ACTIVE | Noted: 2025-01-07

## 2025-07-17 LAB
ABO GROUP BLD: NORMAL
ALBUMIN SERPL BCG-MCNC: 3.5 G/DL (ref 3.5–5)
ALP SERPL-CCNC: 117 U/L (ref 34–104)
ALT SERPL W P-5'-P-CCNC: 17 U/L (ref 7–52)
ANION GAP SERPL CALCULATED.3IONS-SCNC: 9 MMOL/L (ref 4–13)
AST SERPL W P-5'-P-CCNC: 24 U/L (ref 13–39)
BASOPHILS # BLD AUTO: 0.04 THOUSANDS/ÂΜL (ref 0–0.1)
BASOPHILS NFR BLD AUTO: 0 % (ref 0–1)
BILIRUB SERPL-MCNC: 0.3 MG/DL (ref 0.2–1)
BLD GP AB SCN SERPL QL: NEGATIVE
BUN SERPL-MCNC: 16 MG/DL (ref 5–25)
CALCIUM SERPL-MCNC: 9.6 MG/DL (ref 8.4–10.2)
CHLORIDE SERPL-SCNC: 107 MMOL/L (ref 96–108)
CO2 SERPL-SCNC: 21 MMOL/L (ref 21–32)
CREAT SERPL-MCNC: 0.74 MG/DL (ref 0.6–1.3)
CREAT UR-MCNC: 48.2 MG/DL
EOSINOPHIL # BLD AUTO: 0.11 THOUSAND/ÂΜL (ref 0–0.61)
EOSINOPHIL NFR BLD AUTO: 1 % (ref 0–6)
ERYTHROCYTE [DISTWIDTH] IN BLOOD BY AUTOMATED COUNT: 13.8 % (ref 11.6–15.1)
GFR SERPL CREATININE-BSD FRML MDRD: 102 ML/MIN/1.73SQ M
GLUCOSE P FAST SERPL-MCNC: 111 MG/DL (ref 65–99)
GLUCOSE SERPL-MCNC: 111 MG/DL (ref 65–140)
HCT VFR BLD AUTO: 34 % (ref 34.8–46.1)
HGB BLD-MCNC: 11 G/DL (ref 11.5–15.4)
IMM GRANULOCYTES # BLD AUTO: 0.12 THOUSAND/UL (ref 0–0.2)
IMM GRANULOCYTES NFR BLD AUTO: 1 % (ref 0–2)
LYMPHOCYTES # BLD AUTO: 1.35 THOUSANDS/ÂΜL (ref 0.6–4.47)
LYMPHOCYTES NFR BLD AUTO: 15 % (ref 14–44)
MCH RBC QN AUTO: 29.6 PG (ref 26.8–34.3)
MCHC RBC AUTO-ENTMCNC: 32.4 G/DL (ref 31.4–37.4)
MCV RBC AUTO: 91 FL (ref 82–98)
MONOCYTES # BLD AUTO: 0.6 THOUSAND/ÂΜL (ref 0.17–1.22)
MONOCYTES NFR BLD AUTO: 7 % (ref 4–12)
NEUTROPHILS # BLD AUTO: 6.92 THOUSANDS/ÂΜL (ref 1.85–7.62)
NEUTS SEG NFR BLD AUTO: 76 % (ref 43–75)
NRBC BLD AUTO-RTO: 0 /100 WBCS
PLATELET # BLD AUTO: 141 THOUSANDS/UL (ref 149–390)
PMV BLD AUTO: 12.5 FL (ref 8.9–12.7)
POTASSIUM SERPL-SCNC: 3.9 MMOL/L (ref 3.5–5.3)
PROT SERPL-MCNC: 6.2 G/DL (ref 6.4–8.4)
PROT UR-MCNC: 4.9 MG/DL
PROT/CREAT UR: 0.1 MG/G{CREAT}
RBC # BLD AUTO: 3.72 MILLION/UL (ref 3.81–5.12)
RH BLD: POSITIVE
SODIUM SERPL-SCNC: 137 MMOL/L (ref 135–147)
SPECIMEN EXPIRATION DATE: NORMAL
TREPONEMA PALLIDUM IGG+IGM AB [PRESENCE] IN SERUM OR PLASMA BY IMMUNOASSAY: NORMAL
WBC # BLD AUTO: 9.14 THOUSAND/UL (ref 4.31–10.16)

## 2025-07-17 PROCEDURE — NC001 PR NO CHARGE: Performed by: STUDENT IN AN ORGANIZED HEALTH CARE EDUCATION/TRAINING PROGRAM

## 2025-07-17 PROCEDURE — 86780 TREPONEMA PALLIDUM: CPT

## 2025-07-17 PROCEDURE — 82570 ASSAY OF URINE CREATININE: CPT

## 2025-07-17 PROCEDURE — 80053 COMPREHEN METABOLIC PANEL: CPT

## 2025-07-17 PROCEDURE — 86901 BLOOD TYPING SEROLOGIC RH(D): CPT

## 2025-07-17 PROCEDURE — 84156 ASSAY OF PROTEIN URINE: CPT

## 2025-07-17 PROCEDURE — 99213 OFFICE O/P EST LOW 20 MIN: CPT

## 2025-07-17 PROCEDURE — 4A1HXCZ MONITORING OF PRODUCTS OF CONCEPTION, CARDIAC RATE, EXTERNAL APPROACH: ICD-10-PCS | Performed by: STUDENT IN AN ORGANIZED HEALTH CARE EDUCATION/TRAINING PROGRAM

## 2025-07-17 PROCEDURE — 85025 COMPLETE CBC W/AUTO DIFF WBC: CPT

## 2025-07-17 PROCEDURE — 86850 RBC ANTIBODY SCREEN: CPT

## 2025-07-17 PROCEDURE — 86900 BLOOD TYPING SEROLOGIC ABO: CPT

## 2025-07-17 RX ORDER — CALCIUM CARBONATE 500 MG/1
1000 TABLET, CHEWABLE ORAL 2 TIMES DAILY PRN
Status: DISCONTINUED | OUTPATIENT
Start: 2025-07-17 | End: 2025-07-18

## 2025-07-17 RX ORDER — SODIUM CHLORIDE, SODIUM LACTATE, POTASSIUM CHLORIDE, CALCIUM CHLORIDE 600; 310; 30; 20 MG/100ML; MG/100ML; MG/100ML; MG/100ML
125 INJECTION, SOLUTION INTRAVENOUS CONTINUOUS
Status: DISCONTINUED | OUTPATIENT
Start: 2025-07-17 | End: 2025-07-18

## 2025-07-17 RX ORDER — BUPIVACAINE HYDROCHLORIDE 2.5 MG/ML
30 INJECTION, SOLUTION EPIDURAL; INFILTRATION; INTRACAUDAL; PERINEURAL ONCE AS NEEDED
Status: DISCONTINUED | OUTPATIENT
Start: 2025-07-17 | End: 2025-07-18

## 2025-07-17 RX ORDER — LIDOCAINE HYDROCHLORIDE AND EPINEPHRINE 15; 5 MG/ML; UG/ML
INJECTION, SOLUTION EPIDURAL AS NEEDED
Status: DISCONTINUED | OUTPATIENT
Start: 2025-07-17 | End: 2025-07-19 | Stop reason: HOSPADM

## 2025-07-17 RX ORDER — OXYTOCIN/0.9 % SODIUM CHLORIDE 30/500 ML
1-30 PLASTIC BAG, INJECTION (ML) INTRAVENOUS
Status: DISCONTINUED | OUTPATIENT
Start: 2025-07-17 | End: 2025-07-18

## 2025-07-17 RX ORDER — FAMOTIDINE 20 MG/1
20 TABLET, FILM COATED ORAL 2 TIMES DAILY
Status: DISCONTINUED | OUTPATIENT
Start: 2025-07-17 | End: 2025-07-18

## 2025-07-17 RX ORDER — LEVOTHYROXINE SODIUM 50 UG/1
25 TABLET ORAL DAILY
Status: DISCONTINUED | OUTPATIENT
Start: 2025-07-17 | End: 2025-07-18

## 2025-07-17 RX ORDER — ONDANSETRON 2 MG/ML
4 INJECTION INTRAMUSCULAR; INTRAVENOUS EVERY 6 HOURS PRN
Status: DISCONTINUED | OUTPATIENT
Start: 2025-07-17 | End: 2025-07-18

## 2025-07-17 RX ORDER — ACETAMINOPHEN 325 MG/1
975 TABLET ORAL EVERY 6 HOURS PRN
Status: DISCONTINUED | OUTPATIENT
Start: 2025-07-17 | End: 2025-07-18

## 2025-07-17 RX ADMIN — LIDOCAINE HYDROCHLORIDE AND EPINEPHRINE 3 ML: 15; 5 INJECTION, SOLUTION EPIDURAL at 12:21

## 2025-07-17 RX ADMIN — FAMOTIDINE 20 MG: 20 TABLET, FILM COATED ORAL at 23:48

## 2025-07-17 RX ADMIN — ROPIVACAINE HYDROCHLORIDE: 2 INJECTION, SOLUTION EPIDURAL; INFILTRATION at 18:44

## 2025-07-17 RX ADMIN — ROPIVACAINE HYDROCHLORIDE: 2 INJECTION, SOLUTION EPIDURAL; INFILTRATION at 12:25

## 2025-07-17 RX ADMIN — LIDOCAINE HYDROCHLORIDE AND EPINEPHRINE 2 ML: 15; 5 INJECTION, SOLUTION EPIDURAL at 12:22

## 2025-07-17 RX ADMIN — SODIUM CHLORIDE, SODIUM LACTATE, POTASSIUM CHLORIDE, AND CALCIUM CHLORIDE 125 ML/HR: .6; .31; .03; .02 INJECTION, SOLUTION INTRAVENOUS at 13:13

## 2025-07-17 RX ADMIN — SODIUM CHLORIDE, SODIUM LACTATE, POTASSIUM CHLORIDE, AND CALCIUM CHLORIDE 125 ML/HR: .6; .31; .03; .02 INJECTION, SOLUTION INTRAVENOUS at 08:06

## 2025-07-17 RX ADMIN — CALCIUM CARBONATE (ANTACID) CHEW TAB 500 MG 1000 MG: 500 CHEW TAB at 16:42

## 2025-07-17 RX ADMIN — SODIUM CHLORIDE, SODIUM LACTATE, POTASSIUM CHLORIDE, AND CALCIUM CHLORIDE 125 ML/HR: .6; .31; .03; .02 INJECTION, SOLUTION INTRAVENOUS at 09:30

## 2025-07-17 RX ADMIN — SODIUM CHLORIDE, SODIUM LACTATE, POTASSIUM CHLORIDE, AND CALCIUM CHLORIDE 125 ML/HR: .6; .31; .03; .02 INJECTION, SOLUTION INTRAVENOUS at 21:15

## 2025-07-17 RX ADMIN — Medication 2 MILLI-UNITS/MIN: at 16:37

## 2025-07-17 RX ADMIN — BUPIVACAINE HYDROCHLORIDE 5 ML: 2.5 INJECTION, SOLUTION EPIDURAL; INFILTRATION; INTRACAUDAL; PERINEURAL at 23:56

## 2025-07-17 NOTE — ASSESSMENT & PLAN NOTE
41 yo  at 39w6d who presents with leakage of green/brown fluid since 0420 this morning. Speculum exam with pooling of green fluid, nitrazine positive, ferning on microscopy.     Plan:  Admit to L&D  CBC, RPR, Type & Screen  Clear liquid diet   Analgesia at maternal request  Start with pitocin

## 2025-07-17 NOTE — ANESTHESIA PROCEDURE NOTES
Epidural Block    Patient location during procedure: OB/L&D  Start time: 7/17/2025 12:20 PM  Reason for block: procedure for pain  Staffing  Performed by: Lisa Loyola MD  Authorized by: Lisa Loyola MD    Preanesthetic Checklist  Completed: patient identified, IV checked, risks and benefits discussed, surgical consent, monitors and equipment checked, pre-op evaluation and timeout performed  Epidural  Patient position: sitting  Prep: ChloraPrep and site prepped and draped  Sedation Level: no sedation  Patient monitoring: frequent blood pressure checks, continuous pulse oximetry and heart rate  Approach: midline  Location: lumbar, L4-5  Injection technique: MAHESH air  Needle  Needle type: Tuohy   Needle gauge: 18 G  Needle insertion depth: 75 cm  Catheter type: multi-orifice  Catheter size: 20 G  Catheter at skin depth: 13 cm  Catheter securement method: stabilization device and clear occlusive dressing  Test dose: negative  Assessment  Sensory level: T10  Number of attempts: 1negative aspiration for CSF, negative aspiration for heme and no paresthesia on injection  patient tolerated the procedure well with no immediate complications  Additional Notes  Dural Puncture Epidural with 27g spinal needle. Clear CSF obtained, Spinal needle removed and epidural catheter threaded. No complications.

## 2025-07-17 NOTE — OB LABOR/OXYTOCIN SAFETY PROGRESS
Labor Progress Note - No Daigle 40 y.o. female MRN: [id number]    Unit/Bed#: -01 Encounter: [id number]       Contraction Frequency (minutes): 1-3  Contraction Intensity: Mild/Moderate  Uterine Activity Characteristics: Irregular  Cervical Dilation: 4-5        Cervical Effacement: 80  Fetal Station: -2  Baseline Rate (FHR): 135 bpm  Fetal Heart Rate (FHT): 140 BPM  FHR Category: I               Vital Signs:   Vitals:    07/17/25 0642   BP: 135/92   Pulse: 99   Resp:    Temp:    SpO2:        Notes/comments:   SVE as above, unchanged from prior. Discussed that our recommendation is for augmentation with pitocin in the setting of rupture of membranes with irregular contractions. Discussed risk of infection with prolonged rupture of membranes and long labor. She is considering augmentation versus expectant management. If she elects for pitocin, she is considering getting an epidural prior to starting pitocin. She would like a few minutes to discuss w/ her partner. All questions answered to the best of my ability. D/w Dr. Gomez.     Addendum: 1100, pit declined.     Katiuska Plasencia MD 7/17/2025 9:55 AM

## 2025-07-17 NOTE — ASSESSMENT & PLAN NOTE
2 elevated blood pressures (149/97, 135/92) on admission <4 hours apart; first elevated BPs this pregnancy   Denies HA, RUQ pain, chest pain, swelling, vision changes    Thrombocytopenia: 156>141   Bps normotensive overnight   Continue to monitor blood pressure

## 2025-07-17 NOTE — OB LABOR/OXYTOCIN SAFETY PROGRESS
Labor Progress Note - No Daigle 40 y.o. female MRN: [id number]    Unit/Bed#: -01 Encounter: [id number]       Contraction Frequency (minutes): 5  Contraction Intensity: Mild/Moderate  Uterine Activity Characteristics: Irregular  Cervical Dilation: 4-5        Cervical Effacement: 80  Fetal Station: -1  Baseline Rate (FHR): 145 bpm  Fetal Heart Rate (FHT): 124 BPM  FHR Category: I               Vital Signs:   Vitals:    07/17/25 1508   BP: 113/82   Pulse: 98   Resp:    Temp:    SpO2:        Notes/comments:   Reevaluated No after her epidural. Comfortable now. SVE as above, unchanged from prior. Now ruptured 12 hours without cervical change. She is now agreeable to medical recommendation for pitocin at this time.  Pitocin ordered, Dr. Jason lawton.     Katiuska Plasencia MD 7/17/2025 4:14 PM

## 2025-07-17 NOTE — PLAN OF CARE
Problem: PAIN - ADULT  Goal: Verbalizes/displays adequate comfort level or baseline comfort level  Description: Interventions:  - Encourage patient to monitor pain and request assistance  - Assess pain using appropriate pain scale  - Administer analgesics as ordered based on type and severity of pain and evaluate response  - Implement non-pharmacological measures as appropriate and evaluate response  - Consider cultural and social influences on pain and pain management  - Notify physician/advanced practitioner if interventions unsuccessful or patient reports new pain  - Educate patient/family on pain management process including their role and importance of  reporting pain   - Provide non-pharmacologic/complimentary pain relief interventions  7/17/2025 1908 by Akila Kauffman RN  Outcome: Progressing  7/17/2025 1907 by Akila Kauffman RN  Outcome: Progressing     Problem: INFECTION - ADULT  Goal: Absence or prevention of progression during hospitalization  Description: INTERVENTIONS:  - Assess and monitor for signs and symptoms of infection  - Monitor lab/diagnostic results  - Monitor all insertion sites, i.e. indwelling lines, tubes, and drains  - Monitor endotracheal if appropriate and nasal secretions for changes in amount and color  - [place] appropriate cooling/warming therapies per order  - Administer medications as ordered  - Instruct and encourage patient and family to use good hand hygiene technique  - Identify and instruct in appropriate isolation precautions for identified infection/condition  7/17/2025 1908 by Akila Kauffman RN  Outcome: Progressing  7/17/2025 1907 by Akila Kauffman RN  Outcome: Progressing  Goal: Absence of fever/infection during neutropenic period  Description: INTERVENTIONS:  - Monitor WBC  - Perform strict hand hygiene  - Limit to healthy visitors only  - No plants, dried, fresh or silk flowers with kathleen in patient room  7/17/2025 1908 by Akila JAUREGUI  HARIKA Kauffman  Outcome: Progressing  7/17/2025 1907 by Akila Kauffman RN  Outcome: Progressing     Problem: SAFETY ADULT  Goal: Patient will remain free of falls  Description: INTERVENTIONS:  - Educate patient/family on patient safety including physical limitations  - Instruct patient to call for assistance with activity   - Consider consulting OT/PT to assist with strengthening/mobility based on AM PAC & JH-HLM score  - Consult OT/PT to assist with strengthening/mobility   - Keep Call bell within reach  - Keep bed low and locked with side rails adjusted as appropriate  - Keep care items and personal belongings within reach  - Initiate and maintain comfort rounds  - Make Fall Risk Sign visible to staff  - Offer Toileting  in advance of need  - Initiate/Maintain alarm  - Obtain necessary fall risk management equipment:   - Apply yellow socks and bracelet for high fall risk patients  - Consider moving patient to room near nurses station  7/17/2025 1908 by Akila Kauffman RN  Outcome: Progressing  7/17/2025 1907 by Akila Kauffman RN  Outcome: Progressing  Goal: Maintain or return to baseline ADL function  Description: INTERVENTIONS:  -  Assess patient's ability to carry out ADLs; assess patient's baseline for ADL function and identify physical deficits which impact ability to perform ADLs (bathing, care of mouth/teeth, toileting, grooming, dressing, etc.)  - Assess/evaluate cause of self-care deficits   - Assess range of motion  - Assess patient's mobility; develop plan if impaired  - Assess patient's need for assistive devices and provide as appropriate  - Encourage maximum independence but intervene and supervise when necessary  - Involve family in performance of ADLs  - Assess for home care needs following discharge   - Consider OT consult to assist with ADL evaluation and planning for discharge  - Provide patient education as appropriate  - Monitor functional capacity and physical performance, use  of AM PAC & -HLM   - Monitor gait, balance and fatigue with ambulation    7/17/2025 1908 by Akila Kauffman RN  Outcome: Progressing  7/17/2025 1907 by Akila Kauffman RN  Outcome: Progressing  Goal: Maintains/Returns to pre admission functional level  Description: INTERVENTIONS:  - Perform AM-PAC 6 Click Basic Mobility/ Daily Activity assessment daily.  - Set and communicate daily mobility goal to care team and patient/family/caregiver.   - Collaborate with rehabilitation services on mobility goals if consulted  - Out of bed for toileting  - Record patient progress and toleration of activity level   7/17/2025 1908 by Akila Kauffman RN  Outcome: Progressing  7/17/2025 1907 by Akila Kauffman RN  Outcome: Progressing     Problem: DISCHARGE PLANNING  Goal: Discharge to home or other facility with appropriate resources  Description: INTERVENTIONS:  - Identify barriers to discharge w/patient and caregiver  - Arrange for needed discharge resources and transportation as appropriate  - Identify discharge learning needs (meds, wound care, etc.)  - Arrange for interpretive services to assist at discharge as needed  - Refer to Case Management Department for coordinating discharge planning if the patient needs post-hospital services based on physician/advanced practitioner order or complex needs related to functional status, cognitive ability, or social support system  7/17/2025 1908 by Akila Kauffman RN  Outcome: Progressing  7/17/2025 1907 by Akila Kauffman RN  Outcome: Progressing     Problem: Knowledge Deficit  Goal: Patient/family/caregiver demonstrates understanding of disease process, treatment plan, medications, and discharge instructions  Description: Complete learning assessment and assess knowledge base.  Interventions:  - Provide teaching at level of understanding  - Provide teaching via preferred learning methods  7/17/2025 1908 by Akila Kauffman RN  Outcome:  Progressing  7/17/2025 1907 by Akila Kauffman RN  Outcome: Progressing  Goal: Verbalizes understanding of labor plan  Description: Assess patient/family/caregiver's baseline knowledge level and ability to understand information.  Provide education via patient/family/caregiver's preferred learning method at appropriate level of understanding.     1. Provide teaching at level of understanding.  2. Provide teaching via preferred learning method(s).  7/17/2025 1908 by Akila Kauffman RN  Outcome: Progressing  7/17/2025 1907 by Akila Kauffman RN  Outcome: Progressing     Problem: Labor & Delivery  Goal: Manages discomfort  Description: Assess and monitor for signs and symptoms of discomfort.  Assess patient's pain level regularly and per hospital policy.  Administer medications as ordered. Support use of nonpharmacological methods to help control pain such as distraction, imagery, relaxation, and application of heat and cold.  Collaborate with interdisciplinary team and patient to determine appropriate pain management plan.    1. Include patient in decisions related to comfort.  2. Offer non-pharmacological pain management interventions.  3. Report ineffective pain management to physician.  7/17/2025 1908 by Akila Kauffman RN  Outcome: Progressing  7/17/2025 1907 by Akila Kauffman RN  Outcome: Progressing  Goal: Patient vital signs are stable  Description: 1. Assess vital signs - vaginal delivery.  7/17/2025 1908 by Akila Kauffman RN  Outcome: Progressing  7/17/2025 1907 by Akila Kauffman RN  Outcome: Progressing

## 2025-07-17 NOTE — ANESTHESIA PREPROCEDURE EVALUATION
Procedure:  LABOR ANALGESIA    Relevant Problems   ENDO   (+) Hypothyroidism during pregnancy in first trimester      GYN   (+) 39 weeks gestation of pregnancy      Latest Reference Range & Units 07/17/25 07:49   WBC 4.31 - 10.16 Thousand/uL 9.14   RBC 3.81 - 5.12 Million/uL 3.72 (L)   Hemoglobin 11.5 - 15.4 g/dL 11.0 (L)   Hematocrit 34.8 - 46.1 % 34.0 (L)   MCV 82 - 98 fL 91   MCH 26.8 - 34.3 pg 29.6   MCHC 31.4 - 37.4 g/dL 32.4   RDW 11.6 - 15.1 % 13.8   Platelet Count 149 - 390 Thousands/uL 141 (L)   MPV 8.9 - 12.7 fL 12.5   (L): Data is abnormally low     Physical Exam    Airway     Mallampati score: II  TM Distance: >3 FB  Neck ROM: full      Cardiovascular      Dental   No notable dental hx     Pulmonary      Neurological    She appears awake, alert and oriented x3.      Other Findings  post-pubertal.      Anesthesia Plan  ASA Score- 2     Anesthesia Type- epidural with ASA Monitors.         Additional Monitors:     Airway Plan: natural airway.           Plan Factors-Exercise tolerance (METS): >4 METS.    Chart reviewed.   Existing labs reviewed. Patient summary reviewed.    Patient is not a current smoker.  Patient did not smoke on day of surgery.            Induction-     Postoperative Plan- .   Monitoring Plan - Monitoring plan - standard ASA monitoring      Perioperative Resuscitation Plan - Level 1 - Full Code.       Informed Consent- Anesthetic plan and risks discussed with patient.  I personally reviewed this patient with the CRNA. Discussed and agreed on the Anesthesia Plan with the CRNA..      NPO Status:  Vitals Value Taken Time   Date of last liquid 07/17/25 07/17/25 06:25   Time of last liquid 0625 07/17/25 06:25   Date of last solid 07/16/25 07/17/25 06:25   Time of last solid 2000 07/17/25 06:25

## 2025-07-17 NOTE — OB LABOR/OXYTOCIN SAFETY PROGRESS
Oxytocin Safety Progress Check Note - No Daigle 40 y.o. female MRN: [id number]    Unit/Bed#: -01 Encounter: [id number]    Dose (cici-units/min) Oxytocin: 8 cici-units/min  Contraction Frequency (minutes): 1.5-4  Contraction Intensity: Mild/Moderate  Uterine Activity Characteristics: Irregular  Cervical Dilation: 4-5        Cervical Effacement: 80  Fetal Station: -1  Baseline Rate (FHR): 125 bpm  Fetal Heart Rate (FHT): 130 BPM  FHR Category: 1               Vital Signs:   Vitals:    07/17/25 1759   BP: 137/82   Pulse: (!) 112   Resp: 18   Temp: 98.2 °F (36.8 °C)   SpO2:      FHT category 1. SVE deferred. Continue pitocin.     Mony Loaiza MD 7/17/2025 6:42 PM

## 2025-07-17 NOTE — TELEPHONE ENCOUNTER
"REASON FOR CONVERSATION: Rupture of Membranes (/)    SYMPTOMS: Trickling brownish green fluid since 4:20 am. Denies all other symptoms.     OTHER HEALTH INFORMATION: 39w6d  GBS negative. 4 cm and 80% effaced at last appointment.     PROTOCOL DISPOSITION: Go to  Now    CARE ADVICE PROVIDED: Advised patient to head to labor per protocol. Patient will arrive in 30 minutes to [facility]. Notified provider and charge RN.    PRACTICE FOLLOW-UP: N/A    Reason for Disposition   Leakage of fluid from vagina    Answer Assessment - Initial Assessment Questions  1. ONSET: \"When did the symptoms begin?\"           4:20 am patient rolled over an felt a gush of fluids     2. CONTRACTIONS: \"Are you having any contractions?\" If yes, ask: \"Describe the contractions that you are having.\" (e.g., duration, frequency, regularity, severity)    Denies        3. CLAUDE: \"What date are you expecting to deliver?\"        39w6d    4. PARITY: \"Have you had a baby before?\" If Yes, ask: \"How long did the labor last?\"        First baby    5. FETAL MOVEMENT: \"Has the baby's movement decreased or changed significantly from normal?\"        Does not feel movement but patient just woke up.    6. OTHER SYMPTOMS: \"Do you have any other symptoms?\" (e.g., abdominal pain, vaginal bleeding, fever, hand/facial swelling)        Trickling brown green fluid now. Denies all other symptoms.    Was 4 cm on Monday    Protocols used: Pregnancy - Rupture of Membranes-ADULT-AH    "

## 2025-07-17 NOTE — PLAN OF CARE
Problem: PAIN - ADULT  Goal: Verbalizes/displays adequate comfort level or baseline comfort level  Description: Interventions:  - Encourage patient to monitor pain and request assistance  - Assess pain using appropriate pain scale  - Administer analgesics as ordered based on type and severity of pain and evaluate response  - Implement non-pharmacological measures as appropriate and evaluate response  - Consider cultural and social influences on pain and pain management  - Notify physician/advanced practitioner if interventions unsuccessful or patient reports new pain  - Educate patient/family on pain management process including their role and importance of  reporting pain   - Provide non-pharmacologic/complimentary pain relief interventions  Outcome: Progressing     Problem: INFECTION - ADULT  Goal: Absence or prevention of progression during hospitalization  Description: INTERVENTIONS:  - Assess and monitor for signs and symptoms of infection  - Monitor lab/diagnostic results  - Monitor all insertion sites, i.e. indwelling lines, tubes, and drains  - Monitor endotracheal if appropriate and nasal secretions for changes in amount and color  - [place] appropriate cooling/warming therapies per order  - Administer medications as ordered  - Instruct and encourage patient and family to use good hand hygiene technique  - Identify and instruct in appropriate isolation precautions for identified infection/condition  Outcome: Progressing  Goal: Absence of fever/infection during neutropenic period  Description: INTERVENTIONS:  - Monitor WBC  - Perform strict hand hygiene  - Limit to healthy visitors only  - No plants, dried, fresh or silk flowers with kathleen in patient room  Outcome: Progressing     Problem: SAFETY ADULT  Goal: Patient will remain free of falls  Description: INTERVENTIONS:  - Educate patient/family on patient safety including physical limitations  - Instruct patient to call for assistance with activity   -  Consider consulting OT/PT to assist with strengthening/mobility based on AM PAC & JH-HLM score  - Consult OT/PT to assist with strengthening/mobility   - Keep Call bell within reach  - Keep bed low and locked with side rails adjusted as appropriate  - Keep care items and personal belongings within reach  - Initiate and maintain comfort rounds  - Make Fall Risk Sign visible to staff  - Apply yellow socks and bracelet for high fall risk patients  - Consider moving patient to room near nurses station  Outcome: Progressing  Goal: Maintain or return to baseline ADL function  Description: INTERVENTIONS:  -  Assess patient's ability to carry out ADLs; assess patient's baseline for ADL function and identify physical deficits which impact ability to perform ADLs (bathing, care of mouth/teeth, toileting, grooming, dressing, etc.)  - Assess/evaluate cause of self-care deficits   - Assess range of motion  - Assess patient's mobility; develop plan if impaired  - Assess patient's need for assistive devices and provide as appropriate  - Encourage maximum independence but intervene and supervise when necessary  - Involve family in performance of ADLs  - Assess for home care needs following discharge   - Consider OT consult to assist with ADL evaluation and planning for discharge  - Provide patient education as appropriate  - Monitor functional capacity and physical performance, use of AM PAC & JH-HLM   - Monitor gait, balance and fatigue with ambulation    Outcome: Progressing  Goal: Maintains/Returns to pre admission functional level  Description: INTERVENTIONS:  - Perform AM-PAC 6 Click Basic Mobility/ Daily Activity assessment daily.  - Set and communicate daily mobility goal to care team and patient/family/caregiver.   - Collaborate with rehabilitation services on mobility goals if consulted  - Record patient progress and toleration of activity level   Outcome: Progressing     Problem: DISCHARGE PLANNING  Goal: Discharge to  home or other facility with appropriate resources  Description: INTERVENTIONS:  - Identify barriers to discharge w/patient and caregiver  - Arrange for needed discharge resources and transportation as appropriate  - Identify discharge learning needs (meds, wound care, etc.)  - Arrange for interpretive services to assist at discharge as needed  - Refer to Case Management Department for coordinating discharge planning if the patient needs post-hospital services based on physician/advanced practitioner order or complex needs related to functional status, cognitive ability, or social support system  Outcome: Progressing     Problem: Knowledge Deficit  Goal: Patient/family/caregiver demonstrates understanding of disease process, treatment plan, medications, and discharge instructions  Description: Complete learning assessment and assess knowledge base.  Interventions:  - Provide teaching at level of understanding  - Provide teaching via preferred learning methods  Outcome: Progressing  Goal: Verbalizes understanding of labor plan  Description: Assess patient/family/caregiver's baseline knowledge level and ability to understand information.  Provide education via patient/family/caregiver's preferred learning method at appropriate level of understanding.     1. Provide teaching at level of understanding.  2. Provide teaching via preferred learning method(s).  Outcome: Progressing     Problem: Labor & Delivery  Goal: Manages discomfort  Description: Assess and monitor for signs and symptoms of discomfort.  Assess patient's pain level regularly and per hospital policy.  Administer medications as ordered. Support use of nonpharmacological methods to help control pain such as distraction, imagery, relaxation, and application of heat and cold.  Collaborate with interdisciplinary team and patient to determine appropriate pain management plan.    1. Include patient in decisions related to comfort.  2. Offer non-pharmacological  pain management interventions.  3. Report ineffective pain management to physician.  Outcome: Progressing  Goal: Patient vital signs are stable  Description: 1. Assess vital signs - vaginal delivery.  Outcome: Progressing

## 2025-07-17 NOTE — H&P
H&P Exam - Obstetrics   No Daigle 40 y.o. female MRN: [id number]  Unit/Bed#: -01 Encounter: [id number]      ASSESSMENT:  39yo  at 39w6d weeks gestation who is being admitted for rupture of membranes.  EFW: 7 lbs by palpation  VTX by transabdominal ultrasound     PLAN:    Leakage of amniotic fluid  Assessment & Plan  41 yo  at 39w6d who presents with leakage of green/brown fluid since 0420 this morning. Speculum exam with pooling of green fluid, nitrazine positive, ferning on microscopy.     Plan:  Admit to L&D  CBC, RPR, Type & Screen  Clear liquid diet   Analgesia at maternal request  Start with pitocin       Elevated BP without diagnosis of hypertension  Assessment & Plan  2 elevated blood pressures (149/97, 135/92) on admission <4 hours apart; first elevated BPs this pregnancy   Continue to monitor blood pressure  Will follow up admission CBC and CMP; P:C if epidural/burgess catheter placed       Hypothyroidism during pregnancy in first trimester  Assessment & Plan  Continue home levothyroxine 25 mcg daily        Discussed with Dr. Gomez      This patient will be an INPATIENT  and I certify the anticipated length of stay is >2 Midnights.      History of Present Illness     Chief Complaint: SROM    HPI:  No Daigle is a 40 y.o.  female with an CLAUDE of 2025, by Last Menstrual Period at 39w6d weeks gestation who is being admitted for rupture of membranes.    Contractions: denies  Loss of fluid: yes  Vaginal bleeding: denies  Fetal movement: present      PREGNANCY COMPLICATIONS:   1) hypothyroidism   2) elevated BP without diagnosis of hypertension  3) AMA    OB History    Para Term  AB Living   1        SAB IAB Ectopic Multiple Live Births             # Outcome Date GA Lbr Isaac/2nd Weight Sex Type Anes PTL Lv   1 Current                Baby complications/comments: none known     Review of Systems   Constitutional:  Negative for chills and fever.   Respiratory:  Negative  "for shortness of breath.    Cardiovascular:  Negative for chest pain.   Gastrointestinal:  Negative for abdominal pain, nausea and vomiting.   Genitourinary:  Negative for vaginal bleeding.        Leaking fluid   Psychiatric/Behavioral: Negative.           Historical Information   Past Medical History[1]  Past Surgical History[2]  Social History   Social History     Substance and Sexual Activity   Alcohol Use Not Currently     Social History     Substance and Sexual Activity   Drug Use Never     Tobacco Use History[3]  Family History: Family history non-contributory    Meds/Allergies      Medications Prior to Admission:     acetaminophen (TYLENOL) 500 mg tablet    Calcium Carbonate Antacid (TUMS PO)    docusate sodium (COLACE) 50 mg capsule    EQ Aspirin Adult Low Dose 81 MG EC tablet    levothyroxine 25 mcg tablet    Prenat w/o A-[id number]-DHA (PNV OB+DHA PO)   Allergies[4]    OBJECTIVE:    Vitals: Blood pressure 135/92, pulse 99, temperature 98.2 °F (36.8 °C), temperature source Oral, resp. rate 18, height 5' 8\" (1.727 m), weight 97.5 kg (215 lb), last menstrual period 10/11/2024, SpO2 96%.Body mass index is 32.69 kg/m².    Physical Exam  Vitals reviewed. Exam conducted with a chaperone present.   Constitutional:       General: She is not in acute distress.     Appearance: Normal appearance.   HENT:      Head: Normocephalic and atraumatic.     Eyes:      Extraocular Movements: Extraocular movements intact.       Cardiovascular:      Rate and Rhythm: Normal rate.   Pulmonary:      Effort: Pulmonary effort is normal. No respiratory distress.   Abdominal:      Comments: Gravid     Genitourinary:     Comments: Normal appearing external female genitalia, normal appearing urethral meatus. On speculum exam, normal appearing vaginal epithelium, pooling of green fluid, no bleeding, cervix visually dilated.    Neurological:      Mental Status: She is alert.           Ferning: present  Nitrazine: positive "     Cervix:  4.5/80/-2    Fetal heart rate:   Baseline Rate (FHR): 135 bpm  Variability: Moderate  Accelerations: 15 x 15 or greater  Decelerations: None  FHR Category: Category I    [place]:   Contraction Frequency (minutes): 1-3  Contraction Duration (seconds):   Contraction Intensity: Mild/Moderate    GBS: negative    Prenatal Labs:   Blood Type:   Lab Results   Component Value Date/Time    ABO Grouping O 07/17/2025 07:49 AM     , D (Rh type):   Lab Results   Component Value Date/Time    Rh Factor Positive 07/17/2025 07:49 AM    Rh Type Positive 01/02/2025 12:47 PM     , Antibody Screen:   Lab Results   Component Value Date/Time    Antibody Screen Negative 07/17/2025 07:49 AM    , HCT/HGB:   Lab Results   Component Value Date/Time    Hematocrit 34.0 (L) 07/17/2025 07:49 AM    Hemoglobin 11.0 (L) 07/17/2025 07:49 AM      , Platelets:   Lab Results   Component Value Date/Time    Platelets 141 (L) 07/17/2025 07:49 AM      , 1 hour Glucola:   Lab Results   Component Value Date/Time    Glucose 126 04/16/2025 08:46 AM   Rubella: immune   Syphilis: nonreactive  Hep B: negative  Hep C:   Lab Results   Component Value Date/Time    HEP C AB Non Reactive 01/02/2025 12:47 PM   HIV: nonreactive  Chlamydia: not detected  Gonorrhea: not detected    Invasive Devices       Peripheral Intravenous Line  Duration             Peripheral IV 07/17/25 Right Forearm <1 day                      Mony Loaiza MD, PGY-1             [1]   Past Medical History:  Diagnosis Date    Abnormal ECG     High school    History of chickenpox     as child    History of irregular heartbeat     discovered in high school, w/u negative    History of ovarian cyst     Hypothyroid    [2]   Past Surgical History:  Procedure Laterality Date    WISDOM TOOTH EXTRACTION     [3]   Social History  Tobacco Use   Smoking Status Former    Current packs/day: 0.00    Average packs/day: 1 pack/day for 15.0 years (15.0 ttl pk-yrs)    Types: Cigarettes    Start date:  8/1/2007    Quit date: 2/13/2022    Years since quitting: 3.4   Smokeless Tobacco Not on file   [4]   Allergies  Allergen Reactions    Bacitracin Dermatitis, Hives, Itching and Rash    Formaldehyde Dermatitis, Hives, Itching and Rash    Methylmethacrylate Dermatitis, Hives, Itching and Rash    Neomycin Dermatitis, Hives, Itching and Rash

## 2025-07-18 DIAGNOSIS — E03.8 SUBCLINICAL HYPOTHYROIDISM: ICD-10-CM

## 2025-07-18 PROBLEM — O13.9 GESTATIONAL HTN: Status: ACTIVE | Noted: 2025-07-17

## 2025-07-18 LAB
BASE EXCESS BLDCOA CALC-SCNC: -9.7 MMOL/L (ref 3–11)
BASE EXCESS BLDCOV CALC-SCNC: -7.7 MMOL/L (ref 1–9)
HCO3 BLDCOA-SCNC: 18.2 MMOL/L (ref 17.3–27.3)
HCO3 BLDCOV-SCNC: 18.7 MMOL/L (ref 12.2–28.6)
O2 CT VFR BLDCOA CALC: 8.3 ML/DL
OXYHGB MFR BLDCOA: 40.1 %
OXYHGB MFR BLDCOV: 62.3 %
PCO2 BLDCOA: 47.3 MM[HG] (ref 30–60)
PCO2 BLDCOV: 40.9 MM HG (ref 27–43)
PH BLDCOA: 7.2 [PH] (ref 7.23–7.43)
PH BLDCOV: 7.28 [PH] (ref 7.19–7.49)
PO2 BLDCOA: 23 MM HG (ref 5–25)
PO2 BLDCOV: 30.2 MM HG (ref 15–45)
SAO2 % BLDCOV: 12.6 ML/DL

## 2025-07-18 PROCEDURE — NC001 PR NO CHARGE: Performed by: STUDENT IN AN ORGANIZED HEALTH CARE EDUCATION/TRAINING PROGRAM

## 2025-07-18 PROCEDURE — 82805 BLOOD GASES W/O2 SATURATION: CPT | Performed by: STUDENT IN AN ORGANIZED HEALTH CARE EDUCATION/TRAINING PROGRAM

## 2025-07-18 PROCEDURE — 0KQM0ZZ REPAIR PERINEUM MUSCLE, OPEN APPROACH: ICD-10-PCS | Performed by: STUDENT IN AN ORGANIZED HEALTH CARE EDUCATION/TRAINING PROGRAM

## 2025-07-18 PROCEDURE — 59400 OBSTETRICAL CARE: CPT | Performed by: STUDENT IN AN ORGANIZED HEALTH CARE EDUCATION/TRAINING PROGRAM

## 2025-07-18 RX ORDER — CLONIDINE 100 UG/ML
INJECTION, SOLUTION EPIDURAL AS NEEDED
Status: DISCONTINUED | OUTPATIENT
Start: 2025-07-18 | End: 2025-07-19 | Stop reason: HOSPADM

## 2025-07-18 RX ORDER — CALCIUM CARBONATE 500 MG/1
1000 TABLET, CHEWABLE ORAL DAILY PRN
Status: DISCONTINUED | OUTPATIENT
Start: 2025-07-18 | End: 2025-07-20 | Stop reason: HOSPADM

## 2025-07-18 RX ORDER — BUPIVACAINE HYDROCHLORIDE 2.5 MG/ML
INJECTION, SOLUTION EPIDURAL; INFILTRATION; INTRACAUDAL; PERINEURAL AS NEEDED
Status: DISCONTINUED | OUTPATIENT
Start: 2025-07-17 | End: 2025-07-19 | Stop reason: HOSPADM

## 2025-07-18 RX ORDER — LEVOTHYROXINE SODIUM 25 UG/1
25 TABLET ORAL DAILY
Qty: 30 TABLET | Refills: 0 | OUTPATIENT
Start: 2025-07-18

## 2025-07-18 RX ORDER — OXYCODONE HYDROCHLORIDE 5 MG/1
5 TABLET ORAL ONCE
Refills: 0 | Status: COMPLETED | OUTPATIENT
Start: 2025-07-18 | End: 2025-07-18

## 2025-07-18 RX ORDER — ACETAMINOPHEN 325 MG/1
975 TABLET ORAL EVERY 8 HOURS PRN
Status: DISCONTINUED | OUTPATIENT
Start: 2025-07-18 | End: 2025-07-19

## 2025-07-18 RX ORDER — CARBOPROST TROMETHAMINE 250 UG/ML
INJECTION, SOLUTION INTRAMUSCULAR
Status: DISPENSED
Start: 2025-07-18 | End: 2025-07-18

## 2025-07-18 RX ORDER — DOCUSATE SODIUM 100 MG/1
100 CAPSULE, LIQUID FILLED ORAL 2 TIMES DAILY
Status: DISCONTINUED | OUTPATIENT
Start: 2025-07-18 | End: 2025-07-20 | Stop reason: HOSPADM

## 2025-07-18 RX ORDER — METHYLERGONOVINE MALEATE 0.2 MG/ML
INJECTION INTRAVENOUS
Status: DISPENSED
Start: 2025-07-18 | End: 2025-07-18

## 2025-07-18 RX ORDER — LEVOTHYROXINE SODIUM 25 UG/1
25 TABLET ORAL
Status: DISCONTINUED | OUTPATIENT
Start: 2025-07-18 | End: 2025-07-20 | Stop reason: HOSPADM

## 2025-07-18 RX ORDER — BENZOCAINE/MENTHOL 6 MG-10 MG
1 LOZENGE MUCOUS MEMBRANE DAILY PRN
Status: DISCONTINUED | OUTPATIENT
Start: 2025-07-18 | End: 2025-07-20 | Stop reason: HOSPADM

## 2025-07-18 RX ORDER — ONDANSETRON 2 MG/ML
4 INJECTION INTRAMUSCULAR; INTRAVENOUS EVERY 8 HOURS PRN
Status: DISCONTINUED | OUTPATIENT
Start: 2025-07-18 | End: 2025-07-20 | Stop reason: HOSPADM

## 2025-07-18 RX ORDER — DIPHENHYDRAMINE HYDROCHLORIDE 50 MG/ML
25 INJECTION, SOLUTION INTRAMUSCULAR; INTRAVENOUS EVERY 6 HOURS PRN
Status: DISCONTINUED | OUTPATIENT
Start: 2025-07-18 | End: 2025-07-20 | Stop reason: HOSPADM

## 2025-07-18 RX ORDER — IBUPROFEN 600 MG/1
600 TABLET, FILM COATED ORAL EVERY 6 HOURS
Status: DISCONTINUED | OUTPATIENT
Start: 2025-07-18 | End: 2025-07-19

## 2025-07-18 RX ORDER — ACETAMINOPHEN 325 MG/1
650 TABLET ORAL EVERY 4 HOURS PRN
Status: DISCONTINUED | OUTPATIENT
Start: 2025-07-18 | End: 2025-07-18

## 2025-07-18 RX ORDER — OXYTOCIN/0.9 % SODIUM CHLORIDE 30/500 ML
250 PLASTIC BAG, INJECTION (ML) INTRAVENOUS ONCE
Status: COMPLETED | OUTPATIENT
Start: 2025-07-18 | End: 2025-07-18

## 2025-07-18 RX ORDER — TRANEXAMIC ACID 10 MG/ML
1000 INJECTION, SOLUTION INTRAVENOUS ONCE
Status: COMPLETED | OUTPATIENT
Start: 2025-07-18 | End: 2025-07-18

## 2025-07-18 RX ORDER — LIDOCAINE HYDROCHLORIDE 10 MG/ML
INJECTION, SOLUTION EPIDURAL; INFILTRATION; INTRACAUDAL; PERINEURAL
Status: COMPLETED
Start: 2025-07-18 | End: 2025-07-18

## 2025-07-18 RX ORDER — POLYETHYLENE GLYCOL 3350 17 G/17G
17 POWDER, FOR SOLUTION ORAL DAILY PRN
Status: DISCONTINUED | OUTPATIENT
Start: 2025-07-18 | End: 2025-07-20 | Stop reason: HOSPADM

## 2025-07-18 RX ADMIN — ROPIVACAINE HYDROCHLORIDE: 2 INJECTION, SOLUTION EPIDURAL; INFILTRATION at 00:27

## 2025-07-18 RX ADMIN — DOCUSATE SODIUM 100 MG: 100 CAPSULE, LIQUID FILLED ORAL at 11:48

## 2025-07-18 RX ADMIN — TRANEXAMIC ACID 1000 MG: 10 INJECTION, SOLUTION INTRAVENOUS at 10:35

## 2025-07-18 RX ADMIN — Medication 250 MILLI-UNITS/MIN: at 11:19

## 2025-07-18 RX ADMIN — SODIUM CHLORIDE, SODIUM LACTATE, POTASSIUM CHLORIDE, AND CALCIUM CHLORIDE 125 ML/HR: .6; .31; .03; .02 INJECTION, SOLUTION INTRAVENOUS at 03:30

## 2025-07-18 RX ADMIN — IBUPROFEN 600 MG: 600 TABLET ORAL at 18:02

## 2025-07-18 RX ADMIN — DOCUSATE SODIUM 100 MG: 100 CAPSULE, LIQUID FILLED ORAL at 18:02

## 2025-07-18 RX ADMIN — BENZOCAINE AND LEVOMENTHOL 1 APPLICATION: 200; 5 SPRAY TOPICAL at 12:45

## 2025-07-18 RX ADMIN — LIDOCAINE HYDROCHLORIDE 300 MG: 10 INJECTION, SOLUTION EPIDURAL; INFILTRATION; INTRACAUDAL; PERINEURAL at 10:30

## 2025-07-18 RX ADMIN — BUPIVACAINE HYDROCHLORIDE 4 ML: 2.5 INJECTION, SOLUTION EPIDURAL; INFILTRATION; INTRACAUDAL; PERINEURAL at 02:39

## 2025-07-18 RX ADMIN — OXYCODONE HYDROCHLORIDE 5 MG: 5 TABLET ORAL at 11:51

## 2025-07-18 RX ADMIN — SODIUM CHLORIDE, SODIUM LACTATE, POTASSIUM CHLORIDE, AND CALCIUM CHLORIDE 125 ML/HR: .6; .31; .03; .02 INJECTION, SOLUTION INTRAVENOUS at 04:51

## 2025-07-18 RX ADMIN — WITCH HAZEL 1 PAD: 500 SOLUTION RECTAL; TOPICAL at 12:45

## 2025-07-18 RX ADMIN — ROPIVACAINE HYDROCHLORIDE: 2 INJECTION, SOLUTION EPIDURAL; INFILTRATION at 06:53

## 2025-07-18 RX ADMIN — ACETAMINOPHEN 975 MG: 325 TABLET ORAL at 19:27

## 2025-07-18 RX ADMIN — CLONIDINE HYDROCHLORIDE 100 MCG: 0.1 INJECTION, SOLUTION EPIDURAL at 02:39

## 2025-07-18 RX ADMIN — HYDROCORTISONE 1 APPLICATION: 1 CREAM TOPICAL at 12:46

## 2025-07-18 RX ADMIN — IBUPROFEN 600 MG: 600 TABLET ORAL at 11:11

## 2025-07-18 NOTE — ANESTHESIA POSTPROCEDURE EVALUATION
Post-Op Assessment Note    CV Status:  Stable    Pain management: adequate      Post-op block assessment: catheter intact and no complications   Mental Status:  Alert and awake   Hydration Status:  Stable   PONV Controlled:  Controlled   Airway Patency:  Patent     Post Op Vitals Reviewed: Yes    No anethesia notable event occurred.    Staff: CRNA   Comments: Epidural catheter removed without difficulty, tip intact, site clean          Last Filed PACU Vitals:  Vitals Value Taken Time   Temp     Pulse 98 07/18/25 12:11   /69 07/18/25 12:11   Resp     SpO2     Vitals shown include unfiled device data.

## 2025-07-18 NOTE — DISCHARGE SUMMARY
Discharge Summary - OB/GYN   Name: No Daigle 40 y.o. female I MRN: [id number]  Unit/Bed#: -01 I Date of Admission: 2025   Date of Service: 2025 I Hospital Day: 1    ADMISSION  Patient of: [facility]   Admission Date: 2025   Admitting Attending: Dr. Malgorzata Estrada MD  Admitting Diagnoses: Problem List[1]    DELIVERY  Delivery Method: Vaginal, Spontaneous   Delivery Date and Time: 2025 10:22 AM  Delivery Attending: Malgorzata Estrada    DISCHARGE  Discharge Date: 25  Discharge Attending: Dr. Ratliff  Discharge Diagnosis:   Same, Delivered    Clinical course: Admission to Delivery  No Daigle is a 40 y.o.  who was admitted at 40w0d for SROM with meconium fluid. Upon patient request, she labored for several hours before opting for augmentation with pitocin. She progressed to complete at 0548, pushed for 4 hours 6 min, and delivered a healthy  at 1022. Epidural was acquired for pain management.     Delivery  Route of Delivery: Vaginal, Spontaneous    Anesthesia: Epidural,   QBL: Non-Surgical QBL (mL): 272        Delivery: Vaginal, Spontaneous at 2025 10:22 AM  Laceration: Perineal: 2° Repaired? Yes    Baby's Weight: 3714 g (8 lb 3 oz); 131    Apgar scores: 8  and 9  at 1 and 5 minutes, respectively    Clinical Course: Post-Delivery:  The post delivery course was significant for gestational hypertension. Her blood pressures remained largely normotensive throughout her course and she remained asymptomatic for PEC.    On the day of discharge, the patient was ambulating, voiding spontaneously, tolerating oral intake, and hemodynamically stable. She was able to reasonably perform all ADLs. She had appropriate bowel function. Pain was well-controlled. She was discharged home on postpartum/postop day #2 without complications. Patient was instructed to follow up with her OB as an outpatient and was given appropriate warnings to call her provider with  problems or concerns.    Pertinent lab findings included:   Blood type O+.     Last three Hgb values:  Lab Results   Component Value Date    HGB 11.0 (L) 2025    HGB 11.6 2025    HGB 12.0 2025        Problem-specific follow-up plans included the following:  Assessment & Plan   (spontaneous vaginal delivery)  No Daigle is a 40 y.o.  who is PPD2 s/p  at 40w0d   Recovering well   Routine postpartum care, encourage ambulation, encourage familial bonding  Lactation support for breast/bottle feeding as needed  FEN: Tolerating regular diet  Pain: Motrin/Tylenol around the clock, oxycodone if needed  Appropriate bowel and bladder function   DVT Ppx: Ambulation, SCDs     Disposition: Anticipate discharge home today pending blood pressure control  Barriers to discharge: none   Hypothyroidism during pregnancy in first trimester  Continue home levothyroxine 25 mcg daily  Gestational HTN  2 elevated blood pressures (149/97, 135/92) on admission <4 hours apart; first elevated BPs this pregnancy   Denies HA, RUQ pain, chest pain, swelling, vision changes    Bps normotensive overnight   Continue to monitor blood pressure        Discharge med list:       Medication List      ASK your doctor about these medications     acetaminophen 500 mg tablet; Commonly known as: TYLENOL   docusate sodium 50 mg capsule; Commonly known as: COLACE   EQ Aspirin Adult Low Dose 81 MG EC tablet; Generic drug: aspirin; Take 2   tablets by mouth once daily   levothyroxine 25 mcg tablet; Take 1 tablet by mouth once daily   PNV OB+DHA PO   TUMS PO       Condition at discharge:   stable     Disposition:   Home    Planned Readmission:   No    Discharge Statement:  I have spent a total time of 20 minutes in caring for this patient on the day of the visit/encounter.          [1]   Patient Active Problem List  Diagnosis    Hypothyroidism during pregnancy in first trimester    39 weeks gestation of pregnancy    Primigravida of  advanced maternal age in third trimester    Abscess, gluteal, right    Decreased fetal movement affecting management of mother, antepartum    Gestational HTN    Leakage of amniotic fluid

## 2025-07-18 NOTE — OB LABOR/OXYTOCIN SAFETY PROGRESS
Oxytocin Safety Progress Check Note - No Daigle 40 y.o. female MRN: [id number]    Unit/Bed#: -01 Encounter: [id number]    Dose (cici-units/min) Oxytocin: 16 cici-units/min  Contraction Frequency (minutes): 1.5-2  Contraction Intensity: Moderate  Uterine Activity Characteristics: Regular  Cervical Dilation: 9        Cervical Effacement: 90  Fetal Station: 1  Baseline Rate (FHR): 125 bpm  Fetal Heart Rate (FHT): 141 BPM  FHR Category: 1               Vital Signs:   Vitals:    07/18/25 0045   BP: 148/81   Pulse: (!) 108   Resp:    Temp:    SpO2:      FHT category 1. SVE as above. Continue pitocin.     Dr. Gomez aware.     Mony Loaiza MD 7/18/2025 1:11 AM

## 2025-07-18 NOTE — OB LABOR/OXYTOCIN SAFETY PROGRESS
Oxytocin Safety Progress Check Note - No Daigle 40 y.o. female MRN: [id number]    Unit/Bed#: -01 Encounter: [id number]    Dose (cici-units/min) Oxytocin: 16 cici-units/min  Contraction Frequency (minutes): 1-3  Contraction Intensity: Moderate/Strong  Uterine Activity Characteristics: Regular  Cervical Dilation: Lip/rim (Comment)  Dilation Complete Date: 07/18/25     Cervical Effacement: 90  Fetal Station: 0  Baseline Rate (FHR): 130 bpm  Fetal Heart Rate (FHT): 128 BPM  FHR Category: 2               Vital Signs:   Vitals:    07/18/25 0315   BP: 93/55   Pulse: (!) 109   Resp:    Temp:    SpO2:      Prolonged decel for 5 minutes. Patient had just gotten epidural bolus, and BP was 89/55. After IVF bolus and repositioning, BP was 93/55 and FHT recovered. SVE as above. Pitocin decreased from 16 to 8. If FHT continues to be category 2 and BP's are low, will potentially treat BP's.     Discussed with Dr. Jason Loaiza MD 7/18/2025 3:21 AM

## 2025-07-18 NOTE — PLAN OF CARE
Problem: PAIN - ADULT  Goal: Verbalizes/displays adequate comfort level or baseline comfort level  Description: Interventions:  - Encourage patient to monitor pain and request assistance  - Assess pain using appropriate pain scale  - Administer analgesics as ordered based on type and severity of pain and evaluate response  - Implement non-pharmacological measures as appropriate and evaluate response  - Consider cultural and social influences on pain and pain management  - Notify physician/advanced practitioner if interventions unsuccessful or patient reports new pain  - Educate patient/family on pain management process including their role and importance of  reporting pain   - Provide non-pharmacologic/complimentary pain relief interventions  Outcome: Progressing     Problem: INFECTION - ADULT  Goal: Absence or prevention of progression during hospitalization  Description: INTERVENTIONS:  - Assess and monitor for signs and symptoms of infection  - Monitor lab/diagnostic results  - Monitor all insertion sites, i.e. indwelling lines, tubes, and drains  - Monitor endotracheal if appropriate and nasal secretions for changes in amount and color  - [place] appropriate cooling/warming therapies per order  - Administer medications as ordered  - Instruct and encourage patient and family to use good hand hygiene technique  - Identify and instruct in appropriate isolation precautions for identified infection/condition  Outcome: Progressing  Goal: Absence of fever/infection during neutropenic period  Description: INTERVENTIONS:  - Monitor WBC  - Perform strict hand hygiene  - Limit to healthy visitors only  - No plants, dried, fresh or silk flowers with kathleen in patient room  Outcome: Progressing     Problem: SAFETY ADULT  Goal: Patient will remain free of falls  Description: INTERVENTIONS:  - Educate patient/family on patient safety including physical limitations  - Instruct patient to call for assistance with activity   -  Consider consulting OT/PT to assist with strengthening/mobility based on AM PAC & JH-HLM score  - Consult OT/PT to assist with strengthening/mobility   - Keep Call bell within reach  - Keep bed low and locked with side rails adjusted as appropriate  - Keep care items and personal belongings within reach  - Initiate and maintain comfort rounds  - Make Fall Risk Sign visible to staff  - Offer Toileting  in advance of need  - Initiate/Maintain alarm  - Obtain necessary fall risk management equipment:   - Apply yellow socks and bracelet for high fall risk patients  - Consider moving patient to room near nurses station  Outcome: Progressing  Goal: Maintain or return to baseline ADL function  Description: INTERVENTIONS:  -  Assess patient's ability to carry out ADLs; assess patient's baseline for ADL function and identify physical deficits which impact ability to perform ADLs (bathing, care of mouth/teeth, toileting, grooming, dressing, etc.)  - Assess/evaluate cause of self-care deficits   - Assess range of motion  - Assess patient's mobility; develop plan if impaired  - Assess patient's need for assistive devices and provide as appropriate  - Encourage maximum independence but intervene and supervise when necessary  - Involve family in performance of ADLs  - Assess for home care needs following discharge   - Consider OT consult to assist with ADL evaluation and planning for discharge  - Provide patient education as appropriate  - Monitor functional capacity and physical performance, use of AM PAC & JH-HLM   - Monitor gait, balance and fatigue with ambulation    Outcome: Progressing  Goal: Maintains/Returns to pre admission functional level  Description: INTERVENTIONS:  - Perform AM-PAC 6 Click Basic Mobility/ Daily Activity assessment daily.  - Set and communicate daily mobility goal to care team and patient/family/caregiver.   - Collaborate with rehabilitation services on mobility goals if consulted  - Out of bed for  toileting  - Record patient progress and toleration of activity level   Outcome: Progressing     Problem: DISCHARGE PLANNING  Goal: Discharge to home or other facility with appropriate resources  Description: INTERVENTIONS:  - Identify barriers to discharge w/patient and caregiver  - Arrange for needed discharge resources and transportation as appropriate  - Identify discharge learning needs (meds, wound care, etc.)  - Arrange for interpretive services to assist at discharge as needed  - Refer to Case Management Department for coordinating discharge planning if the patient needs post-hospital services based on physician/advanced practitioner order or complex needs related to functional status, cognitive ability, or social support system  Outcome: Progressing     Problem: Knowledge Deficit  Goal: Patient/family/caregiver demonstrates understanding of disease process, treatment plan, medications, and discharge instructions  Description: Complete learning assessment and assess knowledge base.  Interventions:  - Provide teaching at level of understanding  - Provide teaching via preferred learning methods  Outcome: Progressing  Goal: Verbalizes understanding of labor plan  Description: Assess patient/family/caregiver's baseline knowledge level and ability to understand information.  Provide education via patient/family/caregiver's preferred learning method at appropriate level of understanding.     1. Provide teaching at level of understanding.  2. Provide teaching via preferred learning method(s).  Outcome: Progressing     Problem: Labor & Delivery  Goal: Manages discomfort  Description: Assess and monitor for signs and symptoms of discomfort.  Assess patient's pain level regularly and per hospital policy.  Administer medications as ordered. Support use of nonpharmacological methods to help control pain such as distraction, imagery, relaxation, and application of heat and cold.  Collaborate with interdisciplinary team  and patient to determine appropriate pain management plan.    1. Include patient in decisions related to comfort.  2. Offer non-pharmacological pain management interventions.  3. Report ineffective pain management to physician.  Outcome: Progressing  Goal: Patient vital signs are stable  Description: 1. Assess vital signs - vaginal delivery.  Outcome: Progressing

## 2025-07-18 NOTE — OB LABOR/OXYTOCIN SAFETY PROGRESS
Oxytocin Safety Progress Check Note - No Daigle 40 y.o. female MRN: [id number]    Unit/Bed#: -01 Encounter: [id number]    Dose (cici-units/min) Oxytocin: 8 cici-units/min (per Dr. Gomez)  Contraction Frequency (minutes): 1.5-4  Contraction Intensity: Moderate/Strong  Uterine Activity Characteristics: Regular  Cervical Dilation: 10  Dilation Complete Date: 07/18/25  Dilation Complete Time: 0547  Cervical Effacement: 100  Fetal Station: 1  Baseline Rate (FHR): 135 bpm  Fetal Heart Rate (FHT): 135 BPM  FHR Category: 2               Vital Signs:   Vitals:    07/18/25 0530   BP: 93/58   Pulse: 85   Resp:    Temp:    SpO2:        Notes/comments:   Fetal heart tracing category 2 with prolonged decelerations that returned to baseline.  Noted to be complete and +1 station at this time.  Dr. Pérez aware of plan to start pushing    Dimas Rousseau DO 7/18/2025 5:50 AM

## 2025-07-18 NOTE — PLAN OF CARE
Problem: PAIN - ADULT  Goal: Verbalizes/displays adequate comfort level or baseline comfort level  Description: Interventions:  - Encourage patient to monitor pain and request assistance  - Assess pain using appropriate pain scale  - Administer analgesics as ordered based on type and severity of pain and evaluate response  - Implement non-pharmacological measures as appropriate and evaluate response  - Consider cultural and social influences on pain and pain management  - Notify physician/advanced practitioner if interventions unsuccessful or patient reports new pain  - Educate patient/family on pain management process including their role and importance of  reporting pain   - Provide non-pharmacologic/complimentary pain relief interventions  Outcome: Progressing     Problem: INFECTION - ADULT  Goal: Absence or prevention of progression during hospitalization  Description: INTERVENTIONS:  - Assess and monitor for signs and symptoms of infection  - Monitor lab/diagnostic results  - Monitor all insertion sites, i.e. indwelling lines, tubes, and drains  - Monitor endotracheal if appropriate and nasal secretions for changes in amount and color  - [place] appropriate cooling/warming therapies per order  - Administer medications as ordered  - Instruct and encourage patient and family to use good hand hygiene technique  - Identify and instruct in appropriate isolation precautions for identified infection/condition  Outcome: Progressing  Goal: Absence of fever/infection during neutropenic period  Description: INTERVENTIONS:  - Monitor WBC  - Perform strict hand hygiene  - Limit to healthy visitors only  - No plants, dried, fresh or silk flowers with kathleen in patient room  Outcome: Progressing     Problem: SAFETY ADULT  Goal: Patient will remain free of falls  Description: INTERVENTIONS:  - Educate patient/family on patient safety including physical limitations  - Instruct patient to call for assistance with activity   -  Consider consulting OT/PT to assist with strengthening/mobility based on AM PAC & JH-HLM score  - Consult OT/PT to assist with strengthening/mobility   - Keep Call bell within reach  - Keep bed low and locked with side rails adjusted as appropriate  - Keep care items and personal belongings within reach  - Initiate and maintain comfort rounds  - Make Fall Risk Sign visible to staff  - Offer Toileting  in advance of need  - Initiate/Maintain alarm  - Obtain necessary fall risk management equipment:   - Apply yellow socks and bracelet for high fall risk patients  - Consider moving patient to room near nurses station  Outcome: Progressing  Goal: Maintain or return to baseline ADL function  Description: INTERVENTIONS:  -  Assess patient's ability to carry out ADLs; assess patient's baseline for ADL function and identify physical deficits which impact ability to perform ADLs (bathing, care of mouth/teeth, toileting, grooming, dressing, etc.)  - Assess/evaluate cause of self-care deficits   - Assess range of motion  - Assess patient's mobility; develop plan if impaired  - Assess patient's need for assistive devices and provide as appropriate  - Encourage maximum independence but intervene and supervise when necessary  - Involve family in performance of ADLs  - Assess for home care needs following discharge   - Consider OT consult to assist with ADL evaluation and planning for discharge  - Provide patient education as appropriate  - Monitor functional capacity and physical performance, use of AM PAC & JH-HLM   - Monitor gait, balance and fatigue with ambulation    Outcome: Progressing  Goal: Maintains/Returns to pre admission functional level  Description: INTERVENTIONS:  - Perform AM-PAC 6 Click Basic Mobility/ Daily Activity assessment daily.  - Set and communicate daily mobility goal to care team and patient/family/caregiver.   - Collaborate with rehabilitation services on mobility goals if consulted  - Out of bed for  toileting  - Record patient progress and toleration of activity level   Outcome: Progressing     Problem: DISCHARGE PLANNING  Goal: Discharge to home or other facility with appropriate resources  Description: INTERVENTIONS:  - Identify barriers to discharge w/patient and caregiver  - Arrange for needed discharge resources and transportation as appropriate  - Identify discharge learning needs (meds, wound care, etc.)  - Arrange for interpretive services to assist at discharge as needed  - Refer to Case Management Department for coordinating discharge planning if the patient needs post-hospital services based on physician/advanced practitioner order or complex needs related to functional status, cognitive ability, or social support system  Outcome: Progressing     Problem: Knowledge Deficit  Goal: Patient/family/caregiver demonstrates understanding of disease process, treatment plan, medications, and discharge instructions  Description: Complete learning assessment and assess knowledge base.  Interventions:  - Provide teaching at level of understanding  - Provide teaching via preferred learning methods  Outcome: Progressing  Goal: Verbalizes understanding of labor plan  Description: Assess patient/family/caregiver's baseline knowledge level and ability to understand information.  Provide education via patient/family/caregiver's preferred learning method at appropriate level of understanding.     1. Provide teaching at level of understanding.  2. Provide teaching via preferred learning method(s).  Outcome: Progressing     Problem: Labor & Delivery  Goal: Manages discomfort  Description: Assess and monitor for signs and symptoms of discomfort.  Assess patient's pain level regularly and per hospital policy.  Administer medications as ordered. Support use of nonpharmacological methods to help control pain such as distraction, imagery, relaxation, and application of heat and cold.  Collaborate with interdisciplinary team  and patient to determine appropriate pain management plan.    1. Include patient in decisions related to comfort.  2. Offer non-pharmacological pain management interventions.  3. Report ineffective pain management to physician.  Outcome: Progressing  Goal: Patient vital signs are stable  Description: 1. Assess vital signs - vaginal delivery.  Outcome: Progressing

## 2025-07-18 NOTE — L&D DELIVERY NOTE
DELIVERY NOTE  No Daigle 40 y.o. female MRN: [id number]  Unit/Bed#: -01 Encounter: [id number]    Obstetrician:    Dr. Malgorzata Estrada MD    Assistant:   Dr. Marie Fontaine DO    Pre-Delivery Diagnosis:   Problem List[1]    Post-Delivery Diagnosis:   Same as above - Delivered  2nd degree laceration    Procedure:  Spontaneous vaginal delivery  Repair 2nd degree spontaneous laceration      Anesthesia:  epidural    Specimens:   Cord blood obtained   Placenta; normal appearing, central insertion, intact   Arterial and venous blood gases (below)     Gases:  Umbilical Cord Venous Blood Gas:  Results from last 7 days   Lab Units 25  1024   PH COV  7.277   PCO2 COV mm HG 40.9   HCO3 COV mmol/L 18.7   BASE EXC COV mmol/L -7.7*   O2 CT CD VB mL/dL 12.6   O2 HGB, VENOUS CORD % 62.3     Umbilical Cord Arterial Blood Gas:  Results from last 7 days   Lab Units 25  1024   PH COA  7.204*   PCO2 COA  47.3   PO2 COA mm HG 23.0   HCO3 COA mmol/L 18.2   BASE EXC COA mmol/L -9.7*   O2 CONTENT CORD ART ml/dl 8.3   O2 HGB, ARTERIAL CORD % 40.1       Quantitative Blood Loss:   272 mL           Complications:    None apparent    Brief Description of Labor Course:  No Daigle is a 40 y.o.  female admitted to L&D at 40w0d for SROM with meconium fluid. Upon patient request, she labored for several hours before opting for augmentation with pitocin. She progressed to complete at 0548, pushed for 4 hours 6 min, and delivered a healthy  at 1022.     Description of Delivery:   With  the assistance of maternal expulsive forces, the fetal vertex delivered spontaneously. A nuchal cord was not noted. The anterior right shoulder was delivered atraumatically with gentle downward traction. The contralateral arm was delivered with gentle upward traction resulting in a viable male .     Upon delivery, the infant was placed on the mothers abdomen and the cord was doubly clamped and cut after 30 seconds.  The  was noted to have good tone and cry spontaneously. There was no evidence of injury.  The  was passed off to  staff for evaluation. Umbilical cord blood and umbilical artery and venous gases were collected and sent to the lab. An intact placenta was delivered spontaneously at 1029 using fundal massage and gentle cord traction and was noted to have a centrally-inserted 3-vessel cord. Active management of the third stage of labor was undertaken with IV pitocin at 250milliunits/min.    Moderate bleeding was noted and TXA was administered. A bimanual exam was performed. Bleeding was then noted to be minimal.       Outcome:  Living  with APGARS 8 (1 min) and 9 (5 min).    weight: pending    Perineal Inspection/Laceration Repair  Inspection of the perineum, vagina, labia, cervix, and urethra revealed a 2nd degree laceration, which was repaired in standard fashion with 3-0 Vicryl rapide. Patient was comfortable with epidural at that time. The laceration showed good tissue reapproximation and hemostasis.    At the conclusion of the delivery, all needle, sponge, and instrument counts were noted to be correct. Patient tolerated the procedure well and was allowed to recover in labor and delivery room with family and  before being transferred to the post-partum floor.     Conclusion:  Mother and baby are currently recovering nicely in stable condition.    Attending Supervision:   Dr. Malgorzata Estrada MD was present for the entire procedure.    Marie Fontaine DO  OB/GYN PGY-1   2025 2:35 PM         [1]   Patient Active Problem List  Diagnosis    Hypothyroidism during pregnancy in first trimester    39 weeks gestation of pregnancy    Primigravida of advanced maternal age in third trimester    Abscess, gluteal, right    Decreased fetal movement affecting management of mother, antepartum    Gestational HTN    Leakage of amniotic fluid

## 2025-07-18 NOTE — OB LABOR/OXYTOCIN SAFETY PROGRESS
Oxytocin Safety Progress Check Note - No Daigle 40 y.o. female MRN: [id number]    Unit/Bed#: -01 Encounter: [id number]    Dose (cici-units/min) Oxytocin: 8 cici-units/min (per Dr. Gomez)  Contraction Frequency (minutes): 1.5-3  Contraction Intensity: Moderate/Strong  Uterine Activity Characteristics: Regular  Cervical Dilation: Lip/rim (Comment)        Cervical Effacement: 90  Fetal Station: 0  Baseline Rate (FHR): 140 bpm  Fetal Heart Rate (FHT): 140 BPM  FHR Category: 2               Vital Signs:   Vitals:    07/18/25 0500   BP: (!) 88/56   Pulse: 88   Resp:    Temp:    SpO2:      FHT with subtle late decels x1-2, recovered spontaneously. SVE unchanged. Continue pitocin.     Dr. Gomez aware.     Mony Loaiza MD 7/18/2025 5:16 AM

## 2025-07-19 PROCEDURE — 99024 POSTOP FOLLOW-UP VISIT: CPT | Performed by: OBSTETRICS & GYNECOLOGY

## 2025-07-19 RX ORDER — IBUPROFEN 600 MG/1
600 TABLET, FILM COATED ORAL EVERY 6 HOURS PRN
Status: DISCONTINUED | OUTPATIENT
Start: 2025-07-19 | End: 2025-07-20 | Stop reason: HOSPADM

## 2025-07-19 RX ORDER — ACETAMINOPHEN 325 MG/1
975 TABLET ORAL EVERY 8 HOURS SCHEDULED
Status: DISCONTINUED | OUTPATIENT
Start: 2025-07-19 | End: 2025-07-20 | Stop reason: HOSPADM

## 2025-07-19 RX ADMIN — LEVOTHYROXINE SODIUM 25 MCG: 0.03 TABLET ORAL at 06:04

## 2025-07-19 RX ADMIN — ACETAMINOPHEN 975 MG: 325 TABLET ORAL at 09:31

## 2025-07-19 RX ADMIN — DOCUSATE SODIUM 100 MG: 100 CAPSULE, LIQUID FILLED ORAL at 17:38

## 2025-07-19 RX ADMIN — DOCUSATE SODIUM 100 MG: 100 CAPSULE, LIQUID FILLED ORAL at 09:31

## 2025-07-19 RX ADMIN — ACETAMINOPHEN 975 MG: 325 TABLET ORAL at 17:40

## 2025-07-19 NOTE — ASSESSMENT & PLAN NOTE
No Daigle is a 40 y.o.  who is PPD 1 s/p  at 40w0d        Disposition: Anticipate discharge home postpartum Day #2  Barriers to discharge: none

## 2025-07-19 NOTE — PLAN OF CARE
Problem: PAIN - ADULT  Goal: Verbalizes/displays adequate comfort level or baseline comfort level  Description: Interventions:  - Encourage patient to monitor pain and request assistance  - Assess pain using appropriate pain scale  - Administer analgesics as ordered based on type and severity of pain and evaluate response  - Implement non-pharmacological measures as appropriate and evaluate response  - Consider cultural and social influences on pain and pain management  - Notify physician/advanced practitioner if interventions unsuccessful or patient reports new pain  - Educate patient/family on pain management process including their role and importance of  reporting pain   - Provide non-pharmacologic/complimentary pain relief interventions  Outcome: Progressing     Problem: INFECTION - ADULT  Goal: Absence or prevention of progression during hospitalization  Description: INTERVENTIONS:  - Assess and monitor for signs and symptoms of infection  - Monitor lab/diagnostic results  - Monitor all insertion sites, i.e. indwelling lines, tubes, and drains  - Monitor endotracheal if appropriate and nasal secretions for changes in amount and color  - [place] appropriate cooling/warming therapies per order  - Administer medications as ordered  - Instruct and encourage patient and family to use good hand hygiene technique  - Identify and instruct in appropriate isolation precautions for identified infection/condition  Outcome: Progressing  Goal: Absence of fever/infection during neutropenic period  Description: INTERVENTIONS:  - Monitor WBC  - Perform strict hand hygiene  - Limit to healthy visitors only  - No plants, dried, fresh or silk flowers with kathleen in patient room  Outcome: Progressing     Problem: SAFETY ADULT  Goal: Patient will remain free of falls  Description: INTERVENTIONS:  - Educate patient/family on patient safety including physical limitations  - Instruct patient to call for assistance with activity   -  Consider consulting OT/PT to assist with strengthening/mobility based on AM PAC & JH-HLM score  - Consult OT/PT to assist with strengthening/mobility   - Keep Call bell within reach  - Keep bed low and locked with side rails adjusted as appropriate  - Keep care items and personal belongings within reach  - Initiate and maintain comfort rounds  - Make Fall Risk Sign visible to staff  - Offer Toileting  in advance of need  - Initiate/Maintain alarm  - Obtain necessary fall risk management equipment:   - Apply yellow socks and bracelet for high fall risk patients  - Consider moving patient to room near nurses station  Outcome: Progressing  Goal: Maintain or return to baseline ADL function  Description: INTERVENTIONS:  -  Assess patient's ability to carry out ADLs; assess patient's baseline for ADL function and identify physical deficits which impact ability to perform ADLs (bathing, care of mouth/teeth, toileting, grooming, dressing, etc.)  - Assess/evaluate cause of self-care deficits   - Assess range of motion  - Assess patient's mobility; develop plan if impaired  - Assess patient's need for assistive devices and provide as appropriate  - Encourage maximum independence but intervene and supervise when necessary  - Involve family in performance of ADLs  - Assess for home care needs following discharge   - Consider OT consult to assist with ADL evaluation and planning for discharge  - Provide patient education as appropriate  - Monitor functional capacity and physical performance, use of AM PAC & JH-HLM   - Monitor gait, balance and fatigue with ambulation    Outcome: Progressing  Goal: Maintains/Returns to pre admission functional level  Description: INTERVENTIONS:  - Perform AM-PAC 6 Click Basic Mobility/ Daily Activity assessment daily.  - Set and communicate daily mobility goal to care team and patient/family/caregiver.   - Collaborate with rehabilitation services on mobility goals if consulted  - Out of bed for  toileting  - Record patient progress and toleration of activity level   Outcome: Progressing     Problem: DISCHARGE PLANNING  Goal: Discharge to home or other facility with appropriate resources  Description: INTERVENTIONS:  - Identify barriers to discharge w/patient and caregiver  - Arrange for needed discharge resources and transportation as appropriate  - Identify discharge learning needs (meds, wound care, etc.)  - Arrange for interpretive services to assist at discharge as needed  - Refer to Case Management Department for coordinating discharge planning if the patient needs post-hospital services based on physician/advanced practitioner order or complex needs related to functional status, cognitive ability, or social support system  Outcome: Progressing     Problem: POSTPARTUM  Goal: Experiences normal postpartum course  Description: INTERVENTIONS:  - Monitor maternal vital signs  - Assess uterine involution and lochia  Outcome: Progressing  Goal: Appropriate maternal -  bonding  Description: INTERVENTIONS:  - Identify family support  - Assess for appropriate maternal/infant bonding   -Encourage maternal/infant bonding opportunities  - Referral to  or  as needed  Outcome: Progressing  Goal: Establishment of infant feeding pattern  Description: INTERVENTIONS:  - Assess breast/bottle feeding  - Refer to lactation as needed  Outcome: Progressing  Goal: Incision(s), wounds(s) or drain site(s) healing without S/S of infection  Description: INTERVENTIONS  - Assess and document dressing, incision, wound bed, drain sites and surrounding tissue  - Provide patient and family education  - Perform skin care/dressing changes   Outcome: Progressing

## 2025-07-19 NOTE — PROGRESS NOTES
Progress Note - OB/GYN   Name: No Daigle 40 y.o. female I MRN: [id number]  Unit/Bed#: -01 I Date of Admission: 2025   Date of Service: 2025 I Hospital Day: 2    Assessment & Plan   (spontaneous vaginal delivery)  No Daigle is a 40 y.o.  who is PPD 1 s/p  at 40w0d        Disposition: Anticipate discharge home postpartum Day #2  Barriers to discharge: none   Hypothyroidism during pregnancy in first trimester  Continue home levothyroxine 25 mcg daily  Gestational HTN  2 elevated blood pressures (149/97, 135/92) on admission <4 hours apart; first elevated BPs this pregnancy   Denies HA, RUQ pain, chest pain, swelling, vision changes    Thrombocytopenia: 156>141   Bps normotensive overnight   Continue to monitor blood pressure      OB Post-Partum Progress Note  Subjective   Post delivery. Patient is doing well. Lochia WNL. Pain well controlled.     Pain: yes, cramping, improved with meds  Tolerating PO: yes  Voiding: yes  Ambulating: yes  Breastfeeding:  yes, wants to see lactation   Chest pain: no  Shortness of breath: no  Leg pain: no  Lochia: WNL    Objective :  Temp:  [98 °F (36.7 °C)-98.7 °F (37.1 °C)] 98.3 °F (36.8 °C)  HR:  [] 96  BP: (105-159)/(59-89) 105/59  Resp:  [16-18] 16  SpO2:  [96 %-100 %] 96 %  O2 Device: None (Room air)        Objective:     Vitals:  Vitals:    25 1647 25 2100 25 0046 25 0351   BP: 140/75 135/70 128/66 105/59   BP Location:   Left arm Left arm   Pulse: 89 96 101 96   Resp: 18 18 16 16   Temp: 98.3 °F (36.8 °C) 98.3 °F (36.8 °C) 98.7 °F (37.1 °C) 98.3 °F (36.8 °C)   TempSrc: Oral Oral Oral Oral   SpO2: 98% 100% 96% 96%   Weight:       Height:                 Physical Exam    Physical Exam:   GEN: appears well, alert and oriented x 3, pleasant and cooperative   CV: Regular rate, no MRG  RESP: non labored breathing, CTABL  ABDOMEN: soft, no tenderness, no distention, Uterine fundus firm and non-tender, -1 cm below the  umbilicus  EXTREMITIES: non-tender  NEURO Alert and oriented to person, place, and time.         Lab Results: I have reviewed the following results:  Lab Results   Component Value Date    WBC 9.14 07/17/2025    HGB 11.0 (L) 07/17/2025    HCT 34.0 (L) 07/17/2025    MCV 91 07/17/2025     (L) 07/17/2025

## 2025-07-19 NOTE — PLAN OF CARE
Problem: PAIN - ADULT  Goal: Verbalizes/displays adequate comfort level or baseline comfort level  Description: Interventions:  - Encourage patient to monitor pain and request assistance  - Assess pain using appropriate pain scale  - Administer analgesics as ordered based on type and severity of pain and evaluate response  - Implement non-pharmacological measures as appropriate and evaluate response  - Consider cultural and social influences on pain and pain management  - Notify physician/advanced practitioner if interventions unsuccessful or patient reports new pain  - Educate patient/family on pain management process including their role and importance of  reporting pain   - Provide non-pharmacologic/complimentary pain relief interventions  Outcome: Progressing     Problem: INFECTION - ADULT  Goal: Absence or prevention of progression during hospitalization  Description: INTERVENTIONS:  - Assess and monitor for signs and symptoms of infection  - Monitor lab/diagnostic results  - Monitor all insertion sites, i.e. indwelling lines, tubes, and drains  - Monitor endotracheal if appropriate and nasal secretions for changes in amount and color  - [place] appropriate cooling/warming therapies per order  - Administer medications as ordered  - Instruct and encourage patient and family to use good hand hygiene technique  - Identify and instruct in appropriate isolation precautions for identified infection/condition  Outcome: Progressing  Goal: Absence of fever/infection during neutropenic period  Description: INTERVENTIONS:  - Monitor WBC  - Perform strict hand hygiene  - Limit to healthy visitors only  - No plants, dried, fresh or silk flowers with kathleen in patient room  Outcome: Progressing     Problem: SAFETY ADULT  Goal: Patient will remain free of falls  Description: INTERVENTIONS:  - Educate patient/family on patient safety including physical limitations  - Instruct patient to call for assistance with activity   -  Consider consulting OT/PT to assist with strengthening/mobility based on AM PAC & JH-HLM score  - Consult OT/PT to assist with strengthening/mobility   - Keep Call bell within reach  - Keep bed low and locked with side rails adjusted as appropriate  - Keep care items and personal belongings within reach  - Initiate and maintain comfort rounds  - Make Fall Risk Sign visible to staff  - Offer Toileting  in advance of need  - Initiate/Maintain alarm  - Obtain necessary fall risk management equipment:   - Apply yellow socks and bracelet for high fall risk patients  - Consider moving patient to room near nurses station  Outcome: Progressing  Goal: Maintain or return to baseline ADL function  Description: INTERVENTIONS:  -  Assess patient's ability to carry out ADLs; assess patient's baseline for ADL function and identify physical deficits which impact ability to perform ADLs (bathing, care of mouth/teeth, toileting, grooming, dressing, etc.)  - Assess/evaluate cause of self-care deficits   - Assess range of motion  - Assess patient's mobility; develop plan if impaired  - Assess patient's need for assistive devices and provide as appropriate  - Encourage maximum independence but intervene and supervise when necessary  - Involve family in performance of ADLs  - Assess for home care needs following discharge   - Consider OT consult to assist with ADL evaluation and planning for discharge  - Provide patient education as appropriate  - Monitor functional capacity and physical performance, use of AM PAC & JH-HLM   - Monitor gait, balance and fatigue with ambulation    Outcome: Progressing  Goal: Maintains/Returns to pre admission functional level  Description: INTERVENTIONS:  - Perform AM-PAC 6 Click Basic Mobility/ Daily Activity assessment daily.  - Set and communicate daily mobility goal to care team and patient/family/caregiver.   - Collaborate with rehabilitation services on mobility goals if consulted  - Out of bed for  toileting  - Record patient progress and toleration of activity level   Outcome: Progressing     Problem: DISCHARGE PLANNING  Goal: Discharge to home or other facility with appropriate resources  Description: INTERVENTIONS:  - Identify barriers to discharge w/patient and caregiver  - Arrange for needed discharge resources and transportation as appropriate  - Identify discharge learning needs (meds, wound care, etc.)  - Arrange for interpretive services to assist at discharge as needed  - Refer to Case Management Department for coordinating discharge planning if the patient needs post-hospital services based on physician/advanced practitioner order or complex needs related to functional status, cognitive ability, or social support system  Outcome: Progressing     Problem: POSTPARTUM  Goal: Experiences normal postpartum course  Description: INTERVENTIONS:  - Monitor maternal vital signs  - Assess uterine involution and lochia  Outcome: Progressing  Goal: Appropriate maternal -  bonding  Description: INTERVENTIONS:  - Identify family support  - Assess for appropriate maternal/infant bonding   -Encourage maternal/infant bonding opportunities  - Referral to  or  as needed  Outcome: Progressing  Goal: Establishment of infant feeding pattern  Description: INTERVENTIONS:  - Assess breast/bottle feeding  - Refer to lactation as needed  Outcome: Progressing  Goal: Incision(s), wounds(s) or drain site(s) healing without S/S of infection  Description: INTERVENTIONS  - Assess and document dressing, incision, wound bed, drain sites and surrounding tissue  - Provide patient and family education    Outcome: Progressing

## 2025-07-19 NOTE — LACTATION NOTE
This note was copied from a baby's chart.  CONSULT - LACTATION  Baby Boy Daigle (Jennifer) 1 days male MRN: [id number]    [facility] AN NURSERY Room / Bed: (N)/(N) Encounter: [id number]    Maternal Information     MOTHER:  No Daigle  Maternal Age: 40 y.o.  OB History: # 1 - Date: 25, Sex: Male, Weight: 3714 g (8 lb 3 oz), GA: 40w0d, Type: Vaginal, Spontaneous, Apgar1: 8, Apgar5: 9, Living: Living, Birth Comments: None   Previous breast reduction surgery? No    Lactation history:   Has patient previously breast fed: No   How long had patient previously breast fed:     Previous breast feeding complications:       Past Surgical History:   Procedure Laterality Date    WISDOM TOOTH EXTRACTION         Birth information:  YOB: 2024   Time of birth: 10:22 AM   Sex: male   Delivery type: Vaginal, Spontaneous   Birth Weight: 3714 g (8 lb 3 oz)   Percent of Weight Change: -2%     Gestational Age: 40w0d   [unfilled]    Reason for Consult:    Reason for Consult: Initial assessment (ext) - 20 min, Latch Assess (ext) - 20 min    Risk Factors:         Breast and nipple assessment:   Breasts/Nipples  Left Breast: Soft  Right Breast: Soft  Left Nipple: Everted, Tender  Right Nipple: Everted, Tender  Intervention: Lanolin  Breastfeeding Progress: Not yet established    OB Lactation Tools:         Breast Pump:    Breast Pump  Pump: Personal      [place] Assessment: sleeping upon entry; roused when undressed    Feeding assessment: feeding well  LATCH:  Latch: Grasps breast, tongue down, lips flanged, rhythmic sucking   Audible Swallowing: Spontaneous and intermittent (24 hours old)   Type of Nipple: Everted (After stimulation)   Comfort (Breast/Nipple): Filling, red/small blisters/bruises, mild/moderate discomfort   Hold (Positioning): Partial assist, teach one side, mother does other, staff holds   LATCH Score: 8       [place]:  mid lingual frenulum ; suck  blisters on lips; mild click on exam and at breast; tongue movement WNL                 Feeding recommendations:  breast feed on demand    Met with No who is breastfeeding her baby boy and may be discharged home today. No reports that baby is sleepy and has not woken for a feed in 4 hours. Encouraged undressing baby and skin to skin contact to help rouse. Baby began to root when undressed. No reports nipple tenderness but has no damage, just redness on her nipple faces. She prefers to latch baby in football hold; with verbal redirection to align baby's nose with mom's nipple and place his chin on the breast while tilting the nipple, deeper latches were achieved. Some hands on and adequate teach back was used. Discussed initial latch on pain. Discussed how to break the suction. Encouraged maintaining the breast compression and guiding baby on the breast swiftly. Discussed delaying artifical nipples. Discussed cluster feeding. Discussed nipple care and damage remedies/when to use lanolin or silverettes. Discussed feedign frequency and duration based on baby's cues. Encouraged ample skin to skin contact. Encouraged to call for lactation support as needed.   Ivan Pitts MA 7/19/2025 3:33 PM

## 2025-07-20 VITALS
DIASTOLIC BLOOD PRESSURE: 77 MMHG | HEIGHT: 68 IN | HEART RATE: 94 BPM | OXYGEN SATURATION: 95 % | RESPIRATION RATE: 18 BRPM | SYSTOLIC BLOOD PRESSURE: 135 MMHG | BODY MASS INDEX: 32.58 KG/M2 | TEMPERATURE: 97.9 F | WEIGHT: 215 LBS

## 2025-07-20 PROBLEM — O09.513 PRIMIGRAVIDA OF ADVANCED MATERNAL AGE IN THIRD TRIMESTER: Status: RESOLVED | Noted: 2025-01-07 | Resolved: 2025-07-20

## 2025-07-20 PROBLEM — O42.90 LEAKAGE OF AMNIOTIC FLUID: Status: RESOLVED | Noted: 2025-07-17 | Resolved: 2025-07-20

## 2025-07-20 PROCEDURE — 99024 POSTOP FOLLOW-UP VISIT: CPT | Performed by: STUDENT IN AN ORGANIZED HEALTH CARE EDUCATION/TRAINING PROGRAM

## 2025-07-20 RX ORDER — POLYETHYLENE GLYCOL 3350 17 G/17G
17 POWDER, FOR SOLUTION ORAL DAILY PRN
Qty: 20 EACH | Refills: 0 | Status: SHIPPED | OUTPATIENT
Start: 2025-07-20

## 2025-07-20 RX ORDER — ACETAMINOPHEN 325 MG/1
975 TABLET ORAL EVERY 8 HOURS SCHEDULED
Qty: 270 TABLET | Refills: 1 | Status: SHIPPED | OUTPATIENT
Start: 2025-07-20

## 2025-07-20 RX ORDER — BENZOCAINE/MENTHOL 6 MG-10 MG
1 LOZENGE MUCOUS MEMBRANE DAILY PRN
Qty: 14 G | Refills: 0 | Status: SHIPPED | OUTPATIENT
Start: 2025-07-20

## 2025-07-20 RX ORDER — IBUPROFEN 600 MG/1
600 TABLET, FILM COATED ORAL EVERY 6 HOURS PRN
Qty: 30 TABLET | Refills: 0 | Status: SHIPPED | OUTPATIENT
Start: 2025-07-20

## 2025-07-20 RX ADMIN — ACETAMINOPHEN 975 MG: 325 TABLET ORAL at 01:06

## 2025-07-20 RX ADMIN — IBUPROFEN 600 MG: 600 TABLET ORAL at 06:10

## 2025-07-20 RX ADMIN — LEVOTHYROXINE SODIUM 25 MCG: 0.03 TABLET ORAL at 06:10

## 2025-07-20 NOTE — ASSESSMENT & PLAN NOTE
2 elevated blood pressures (149/97, 135/92) on admission <4 hours apart; first elevated BPs this pregnancy   Denies HA, RUQ pain, chest pain, swelling, vision changes    Bps normotensive overnight   Continue to monitor blood pressure

## 2025-07-20 NOTE — PROGRESS NOTES
Progress Note - OB/GYN   Name: No Daigle 40 y.o. female I MRN: [id number]  Unit/Bed#: -01 I Date of Admission: 2025   Date of Service: 2025 I Hospital Day: 3    Assessment & Plan   (spontaneous vaginal delivery)  No Daigle is a 40 y.o.  who is PPD2 s/p  at 40w0d   Recovering well   Routine postpartum care, encourage ambulation, encourage familial bonding  Lactation support for breast/bottle feeding as needed  FEN: Tolerating regular diet  Pain: Motrin/Tylenol around the clock, oxycodone if needed  Appropriate bowel and bladder function   DVT Ppx: Ambulation, SCDs     Disposition: Anticipate discharge home today pending blood pressure control  Barriers to discharge: none   Hypothyroidism during pregnancy in first trimester  Continue home levothyroxine 25 mcg daily  Gestational HTN  2 elevated blood pressures (149/97, 135/92) on admission <4 hours apart; first elevated BPs this pregnancy   Denies HA, RUQ pain, chest pain, swelling, vision changes    Bps normotensive overnight   Continue to monitor blood pressure      OB Post-Partum Progress Note  Subjective   Post delivery. Patient is doing well. Lochia WNL. Pain well controlled.     Pain: yes, cramping, improved with meds  Tolerating PO: yes  Voiding: yes  Ambulating: yes  Breastfeeding:  yes  Chest pain: no  Shortness of breath: no  Leg pain: no  Lochia: WNL    Objective :  Temp:  [98.1 °F (36.7 °C)-98.2 °F (36.8 °C)] 98.1 °F (36.7 °C)  HR:  [] 96  BP: (118-144)/(59-89) 139/89  Resp:  [16-18] 18  SpO2:  [95 %-100 %] 95 %  O2 Device: None (Room air)    Physical Exam  Physical Exam  GEN: No Daigle appears well, alert and oriented x 3, pleasant and cooperative   CARDIO: Regular Rate  RESP:  non-labored breathing  ABDOMEN: soft, no tenderness, firm fundus at umbilicus  EXTREMITIES: Non tender, trace edema appropriate for post-pregnancy, no erythema     Lab Results: I have reviewed the following results:  Lab Results    Component Value Date    WBC 9.14 07/17/2025    HGB 11.0 (L) 07/17/2025    HCT 34.0 (L) 07/17/2025    MCV 91 07/17/2025     (L) 07/17/2025

## 2025-07-20 NOTE — PLAN OF CARE
Problem: PAIN - ADULT  Goal: Verbalizes/displays adequate comfort level or baseline comfort level  Description: Interventions:  - Encourage patient to monitor pain and request assistance  - Assess pain using appropriate pain scale  - Administer analgesics as ordered based on type and severity of pain and evaluate response  - Implement non-pharmacological measures as appropriate and evaluate response  - Consider cultural and social influences on pain and pain management  - Notify physician/advanced practitioner if interventions unsuccessful or patient reports new pain  - Educate patient/family on pain management process including their role and importance of  reporting pain   - Provide non-pharmacologic/complimentary pain relief interventions  Outcome: Progressing     Problem: INFECTION - ADULT  Goal: Absence or prevention of progression during hospitalization  Description: INTERVENTIONS:  - Assess and monitor for signs and symptoms of infection  - Monitor lab/diagnostic results  - Monitor all insertion sites, i.e. indwelling lines, tubes, and drains  - Monitor endotracheal if appropriate and nasal secretions for changes in amount and color  - [place] appropriate cooling/warming therapies per order  - Administer medications as ordered  - Instruct and encourage patient and family to use good hand hygiene technique  - Identify and instruct in appropriate isolation precautions for identified infection/condition  Outcome: Progressing  Goal: Absence of fever/infection during neutropenic period  Description: INTERVENTIONS:  - Monitor WBC  - Perform strict hand hygiene  - Limit to healthy visitors only  - No plants, dried, fresh or silk flowers with kathleen in patient room  Outcome: Progressing     Problem: SAFETY ADULT  Goal: Patient will remain free of falls  Description: INTERVENTIONS:  - Educate patient/family on patient safety including physical limitations  - Instruct patient to call for assistance with activity   -  Consider consulting OT/PT to assist with strengthening/mobility based on AM PAC & JH-HLM score  - Consult OT/PT to assist with strengthening/mobility   - Keep Call bell within reach  - Keep bed low and locked with side rails adjusted as appropriate  - Keep care items and personal belongings within reach  - Initiate and maintain comfort rounds  - Make Fall Risk Sign visible to staff  - Offer Toileting  in advance of need  - Initiate/Maintain alarm  - Obtain necessary fall risk management equipment:   - Apply yellow socks and bracelet for high fall risk patients  - Consider moving patient to room near nurses station  Outcome: Progressing  Goal: Maintain or return to baseline ADL function  Description: INTERVENTIONS:  -  Assess patient's ability to carry out ADLs; assess patient's baseline for ADL function and identify physical deficits which impact ability to perform ADLs (bathing, care of mouth/teeth, toileting, grooming, dressing, etc.)  - Assess/evaluate cause of self-care deficits   - Assess range of motion  - Assess patient's mobility; develop plan if impaired  - Assess patient's need for assistive devices and provide as appropriate  - Encourage maximum independence but intervene and supervise when necessary  - Involve family in performance of ADLs  - Assess for home care needs following discharge   - Consider OT consult to assist with ADL evaluation and planning for discharge  - Provide patient education as appropriate  - Monitor functional capacity and physical performance, use of AM PAC & JH-HLM   - Monitor gait, balance and fatigue with ambulation    Outcome: Progressing  Goal: Maintains/Returns to pre admission functional level  Description: INTERVENTIONS:  - Perform AM-PAC 6 Click Basic Mobility/ Daily Activity assessment daily.  - Set and communicate daily mobility goal to care team and patient/family/caregiver.   - Collaborate with rehabilitation services on mobility goals if consulted  - Out of bed for  toileting  - Record patient progress and toleration of activity level   Outcome: Progressing     Problem: DISCHARGE PLANNING  Goal: Discharge to home or other facility with appropriate resources  Description: INTERVENTIONS:  - Identify barriers to discharge w/patient and caregiver  - Arrange for needed discharge resources and transportation as appropriate  - Identify discharge learning needs (meds, wound care, etc.)  - Arrange for interpretive services to assist at discharge as needed  - Refer to Case Management Department for coordinating discharge planning if the patient needs post-hospital services based on physician/advanced practitioner order or complex needs related to functional status, cognitive ability, or social support system  Outcome: Progressing     Problem: POSTPARTUM  Goal: Experiences normal postpartum course  Description: INTERVENTIONS:  - Monitor maternal vital signs  - Assess uterine involution and lochia  Outcome: Progressing  Goal: Appropriate maternal -  bonding  Description: INTERVENTIONS:  - Identify family support  - Assess for appropriate maternal/infant bonding   -Encourage maternal/infant bonding opportunities  - Referral to  or  as needed  Outcome: Progressing  Goal: Establishment of infant feeding pattern  Description: INTERVENTIONS:  - Assess breast/bottle feeding  - Refer to lactation as needed  Outcome: Progressing  Goal: Incision(s), wounds(s) or drain site(s) healing without S/S of infection  Description: INTERVENTIONS  - Assess and document dressing, incision, wound bed, drain sites and surrounding tissue  - Provide patient and family education  - Perform skin care/dressing changes  Outcome: Progressing

## 2025-07-20 NOTE — ASSESSMENT & PLAN NOTE
No Daigle is a 40 y.o.  who is PPD2 s/p  at 40w0d   Recovering well   Routine postpartum care, encourage ambulation, encourage familial bonding  Lactation support for breast/bottle feeding as needed  FEN: Tolerating regular diet  Pain: Motrin/Tylenol around the clock, oxycodone if needed  Appropriate bowel and bladder function   DVT Ppx: Ambulation, SCDs     Disposition: Anticipate discharge home today pending blood pressure control  Barriers to discharge: none

## 2025-07-20 NOTE — PLAN OF CARE
Problem: PAIN - ADULT  Goal: Verbalizes/displays adequate comfort level or baseline comfort level  Description: Interventions:  - Encourage patient to monitor pain and request assistance  - Assess pain using appropriate pain scale  - Administer analgesics as ordered based on type and severity of pain and evaluate response  - Implement non-pharmacological measures as appropriate and evaluate response  - Consider cultural and social influences on pain and pain management  - Notify physician/advanced practitioner if interventions unsuccessful or patient reports new pain  - Educate patient/family on pain management process including their role and importance of  reporting pain   - Provide non-pharmacologic/complimentary pain relief interventions  Outcome: Progressing     Problem: INFECTION - ADULT  Goal: Absence or prevention of progression during hospitalization  Description: INTERVENTIONS:  - Assess and monitor for signs and symptoms of infection  - Monitor lab/diagnostic results  - Monitor all insertion sites, i.e. indwelling lines, tubes, and drains  - Monitor endotracheal if appropriate and nasal secretions for changes in amount and color  - [place] appropriate cooling/warming therapies per order  - Administer medications as ordered  - Instruct and encourage patient and family to use good hand hygiene technique  - Identify and instruct in appropriate isolation precautions for identified infection/condition  Outcome: Progressing  Goal: Absence of fever/infection during neutropenic period  Description: INTERVENTIONS:  - Monitor WBC  - Perform strict hand hygiene  - Limit to healthy visitors only  - No plants, dried, fresh or silk flowers with kathleen in patient room  Outcome: Progressing     Problem: SAFETY ADULT  Goal: Patient will remain free of falls  Description: INTERVENTIONS:  - Educate patient/family on patient safety including physical limitations  - Instruct patient to call for assistance with activity   -  Consider consulting OT/PT to assist with strengthening/mobility based on AM PAC & JH-HLM score  - Consult OT/PT to assist with strengthening/mobility   - Keep Call bell within reach  - Keep bed low and locked with side rails adjusted as appropriate  - Keep care items and personal belongings within reach  - Initiate and maintain comfort rounds  - Make Fall Risk Sign visible to staff  - Offer Toileting  in advance of need  - Initiate/Maintain alarm  - Obtain necessary fall risk management equipment:   - Apply yellow socks and bracelet for high fall risk patients  - Consider moving patient to room near nurses station  Outcome: Progressing  Goal: Maintain or return to baseline ADL function  Description: INTERVENTIONS:  -  Assess patient's ability to carry out ADLs; assess patient's baseline for ADL function and identify physical deficits which impact ability to perform ADLs (bathing, care of mouth/teeth, toileting, grooming, dressing, etc.)  - Assess/evaluate cause of self-care deficits   - Assess range of motion  - Assess patient's mobility; develop plan if impaired  - Assess patient's need for assistive devices and provide as appropriate  - Encourage maximum independence but intervene and supervise when necessary  - Involve family in performance of ADLs  - Assess for home care needs following discharge   - Consider OT consult to assist with ADL evaluation and planning for discharge  - Provide patient education as appropriate  - Monitor functional capacity and physical performance, use of AM PAC & JH-HLM   - Monitor gait, balance and fatigue with ambulation    Outcome: Progressing  Goal: Maintains/Returns to pre admission functional level  Description: INTERVENTIONS:  - Perform AM-PAC 6 Click Basic Mobility/ Daily Activity assessment daily.  - Set and communicate daily mobility goal to care team and patient/family/caregiver.   - Collaborate with rehabilitation services on mobility goals if consulted  - Out of bed for  toileting  - Record patient progress and toleration of activity level   Outcome: Progressing     Problem: DISCHARGE PLANNING  Goal: Discharge to home or other facility with appropriate resources  Description: INTERVENTIONS:  - Identify barriers to discharge w/patient and caregiver  - Arrange for needed discharge resources and transportation as appropriate  - Identify discharge learning needs (meds, wound care, etc.)  - Arrange for interpretive services to assist at discharge as needed  - Refer to Case Management Department for coordinating discharge planning if the patient needs post-hospital services based on physician/advanced practitioner order or complex needs related to functional status, cognitive ability, or social support system  Outcome: Progressing     Problem: POSTPARTUM  Goal: Experiences normal postpartum course  Description: INTERVENTIONS:  - Monitor maternal vital signs  - Assess uterine involution and lochia  Outcome: Progressing  Goal: Appropriate maternal -  bonding  Description: INTERVENTIONS:  - Identify family support  - Assess for appropriate maternal/infant bonding   -Encourage maternal/infant bonding opportunities  - Referral to  or  as needed  Outcome: Progressing  Goal: Establishment of infant feeding pattern  Description: INTERVENTIONS:  - Assess breast/bottle feeding  - Refer to lactation as needed  Outcome: Progressing  Goal: Incision(s), wounds(s) or drain site(s) healing without S/S of infection  Description: INTERVENTIONS  - Assess and document dressing, incision, wound bed, drain sites and surrounding tissue  - Provide patient and family education  - Outcome: Progressing

## 2025-07-20 NOTE — ASSESSMENT & PLAN NOTE
2 elevated blood pressures (149/97, 135/92) on admission <4 hours apart; first elevated BPs this pregnancy   Denies HA, RUQ pain, chest pain, swelling, vision changes    Labs wnl except thrombocytopenia: 156 on >141 on admit, most likley gestational thrombocytopenia rather than HELLP   Continue to monitor blood pressure    Systolic (12hrs), Av , Min:118 , Max:144     Diastolic (12hrs), Av, Min:59, Max:89

## 2025-07-20 NOTE — ASSESSMENT & PLAN NOTE
No Daigle is a 40 y.o.  who is PPD 2 s/p  at 40w0d     Routine postpartum care  Encourage ambulation  Encourage familial bonding  Lactation support as needed  Pain: Motrin/Tylenol   Postpartum Contraceptive plan: ***     Disposition: Anticipate discharge home postpartum Day #2  Barriers to discharge: none

## 2025-07-20 NOTE — LACTATION NOTE
This note was copied from a baby's chart.  CONSULT - LACTATION  Baby Boy (Kurtis Daigle 2 days male MRN: [id number]    [facility] AN NURSERY Room / Bed: (N)/(N) Encounter: [id number]    Maternal Information     MOTHER:  No Daigle  Maternal Age: 40 y.o.  OB History: # 1 - Date: 25, Sex: Male, Weight: 3714 g (8 lb 3 oz), GA: 40w0d, Type: Vaginal, Spontaneous, Apgar1: 8, Apgar5: 9, Living: Living, Birth Comments: None   Previous breast reduction surgery? No    Lactation history:   Has patient previously breast fed: No   How long had patient previously breast fed:     Previous breast feeding complications:       Past Surgical History:   Procedure Laterality Date    WISDOM TOOTH EXTRACTION         Birth information:  YOB: [birth year]25   Time of birth: 10:22 AM   Sex: male   Delivery type: Vaginal, Spontaneous   Birth Weight: 3714 g (8 lb 3 oz)   Percent of Weight Change: -6%     Gestational Age: 40w0d   [unfilled]    Reason for Consult:    Reason for Consult: Discharge (ext) - 20 min    Risk Factors:         Breast and nipple assessment:   Breasts/Nipples  Left Breast: Soft  Right Breast: Soft  Left Nipple: Everted, Tender  Right Nipple: Everted, Tender  Intervention: Lanolin (nipple butter)  Breastfeeding Progress: Not yet established, Breastfeeding well    OB Lactation Tools:         Breast Pump:    Breast Pump  Pump: Personal      Josephine Assessment: sleeping on mom    Feeding assessment: feeding well      Feeding recommendations:  breast feed on demand    Met with No who is breastfeeding her baby boy and both are anticipating discharge home today. No reports that baby cluster fed last night. She reports tender nipples and states she has been using the nipple cream but finds the telfa irritating.  Her nipples are red but not damaged. No reports that latching has improved with static breast compression/tilt. Discussed   immature gut and gassiness. Discussed engorgement and softening the breast if needed to assist in latching deeply as needed. Encouraged following up with lactation outpatient; discussed calling for nipple pain/damage that does not improve by the end of the week. Briefly reviewed the Discharge Breastfeeding Booklet.   Ivan Pitts MA 7/20/2025 12:19 PM

## 2025-07-20 NOTE — DISCHARGE INSTR - AVS FIRST PAGE
Vaginal Delivery   WHAT YOU SHOULD KNOW:   A vaginal delivery is the birth of your baby through your vagina (birth canal).        AFTER YOU LEAVE:   Medicines:  NSAIDs  help decrease swelling and pain or fever. This medicine is available with or without a doctor's order. NSAIDs can cause stomach bleeding or kidney problems in certain people. If you take blood thinner medicine, always ask your healthcare provider if NSAIDs are safe for you. Always read the medicine label and follow directions.    Take your medicine as directed.  Call your healthcare provider if you think your medicine is not helping or if you have side effects. Tell him if you are allergic to any medicine. Keep a list of the medicines, vitamins, and herbs you take. Include the amounts, and when and why you take them. Bring the list or the pill bottles to follow-up visits. Carry your medicine list with you in case of an emergency.  Follow up with your primary healthcare provider:  Most women need to return 6 weeks after a vaginal delivery. Ask about how to care for your wounds or stitches. Write down your questions so you remember to ask them during your visits.  Activity:  Rest as much as possible. Try to keep all activities short. You may be able to do some exercise soon after you have your baby. Talk with your primary healthcare provider before you start exercising. If you work outside the home, ask when you can return to your job.  Kegel exercises:  Kegel exercises may help your vaginal and rectal muscles heal faster. You can do Kegel exercises by tightening and relaxing the muscles around your vagina. Kegel exercises help make the muscles stronger.   Breast care:  When your milk comes in, your breasts may feel full and hard. Ask how to care for your breasts, even if you are not breastfeeding.   Constipation:  Do not try to push the bowel movement out if it is too hard. High-fiber foods, extra liquids, and regular exercise can help you prevent  constipation. Examples of high-fiber foods are fruit and bran. Prune juice and water are good liquids to drink. Regular exercise helps your digestive system work. You may also be told to take over-the-counter fiber and stool softener medicines. Take these items as directed.   Hemorrhoids:  Pregnancy can cause severe hemorrhoids. You may have rectal pain because of the hemorrhoids. Ask how to prevent or treat hemorrhoids.  Perineum care:  Your perineum is the area between your vagina and anus. Keep the area clean and dry to help it heal and to prevent infection. Wash the area gently with soap and water when you bathe or shower. Rinse your perineum with warm water when you use the toilet. Your primary healthcare provider may suggest you use a warm sitz bath to help decrease pain. A sitz bath is a bathtub or basin filled to hip level. Stay in the sitz bath for 20 to 30 minutes, or as directed.   Vaginal discharge:  You will have vaginal discharge, called lochia, after your delivery. The lochia is bright red the first day or two after the birth. By the fourth day, the amount decreases, and it turns red-brown. Use a sanitary pad rather than a tampon to prevent a vaginal infection. It is normal to have lochia up to 8 weeks after your baby is born.   Monthly periods:  Your period may start again within 7 to 12 weeks after your baby is born. If you are breastfeeding, it may take longer for your period to start again. You can still get pregnant again even though you do not have your monthly period. Talk with your primary healthcare provider about a birth control method that will be good for you if you do not want to get pregnant.  Mood changes:  Many new mothers have some kind of mood changes after delivery. Some of these changes occur because of lack of sleep, hormone changes, and caring for a new baby. Some mood changes can be more serious, such as postpartum depression. Talk with your primary healthcare provider if you  feel unable to care for yourself or your baby.  Sexual activity:  You may need to avoid sex for 6 to 7 weeks after you have your baby. You may notice you have a decreased desire for sex, or sex may be painful. You may need to use a vaginal lubricant (gel) to help make sex more comfortable.  Contact your primary healthcare provider if:   You have heavy vaginal bleeding that fills 1 or more sanitary pads in 1 hour.    You have a fever.    Your pain does not go away, or gets worse.    The skin between your vagina and rectum is swollen, warm, or red.    You have swollen, hard, or painful breasts.    You feel very sad or depressed.    You feel more tired than usual.     You have questions or concerns about your condition or care.  Seek care immediately or call 911 if:   You have pus or yellow drainage coming from your vagina or wound.    You are urinating very little, or not at all.    Your arm or leg feels warm, tender, and painful. It may look swollen and red.    You feel lightheaded, have sudden and worsening chest pain, or trouble breathing. You may have more pain when you take deep breaths or cough, or you may cough up blood.  © 2014 [employer]. Information is for End User's use only and may not be sold, redistributed or otherwise used for commercial purposes. All illustrations and images included in CareNotes® are the copyrighted property of [employer]. or [employer].  The above information is an  only. It is not intended as medical advice for individual conditions or treatments. Talk to your doctor, nurse or pharmacist before following any medical regimen to see if it is safe and effective for you.    Postpartum Perineal Care   WHAT YOU NEED TO KNOW:   Postpartum perineal care is care for your perineum after you have a baby. The perineum is your vagina and anus.   DISCHARGE INSTRUCTIONS:   Care for your perineum:  Healthcare providers will give you a small squirt  bottle and show you how to use it. Do the following after you use the toilet and before you put on a new pad:  Remove the soiled pad    Use the squirt bottle to rinse your perineum from front to back while you sit on the toilet     Pat the area dry from front to back with toilet paper or a cotton cloth     Put on a fresh pad    Wash your hands  Decrease pain:  Ask your healthcare provider about these and other ways to decrease perineal pain:  Sitz baths:  Healthcare providers may give you a portable sitz bath. This is a small tub that fits in the toilet. Fill the sitz bath or bathtub with 4 to 6 inches of warm water. Sit in the warm water for 20 minutes 2 to 3 times a day.    Ice:  Ice helps decrease swelling and pain. Ice may also help prevent tissue damage. Use an ice pack, or put crushed ice in a plastic bag. Cover it with a towel and place it on your perineum for 15 to 20 minutes every hour, or as directed.    Medicine spray, wipes, or pads:  Healthcare providers may give you a medicine spray or wipes soaked with numbing medicine to decrease the pain. Pads that contain an herb called witch hazel may also help reduce pain. Use these after perineal care or a sitz bath.  Follow up with your healthcare provider as directed:  Write down your questions so you remember to ask them during your visits.   Contact your healthcare provider if:   You have heavy vaginal bleeding that fills 1 or more sanitary pads in 1 hour.    You have foul-smelling vaginal discharge.    You feel weak or lightheaded.    You have questions or concerns about your condition or care.  Seek care immediately or call 911 if:   You have large blood clots or bright red blood coming from your vagina.    You have abdominal pain, vomiting, and a fever.  © 2017 [employer] Information is for End User's use only and may not be sold, redistributed or otherwise used for commercial purposes. All illustrations and images included in CareNotes®  are the copyrighted property of [employer]. or [employer].  The above information is an  only. It is not intended as medical advice for individual conditions or treatments. Talk to your doctor, nurse or pharmacist before following any medical regimen to see if it is safe and effective for you.      Postpartum Depression   WHAT YOU NEED TO KNOW:   What is postpartum depression?  Postpartum depression is a mood disorder that occurs after giving birth. A mood is an emotion or a feeling. Moods affect your behavior and how you feel about yourself and life in general. Depression is a sad mood that you cannot control. Women often feel sad, afraid, or nervous after their baby is born. These feelings are called postpartum blues or baby blues, and they usually go away in 1 to 2 weeks. With postpartum depression, these symptoms get worse and continue for more than 2 weeks. Postpartum depression is a serious condition that affects your daily activities and relationships.   What causes postpartum depression?  Healthcare providers do not know exactly what causes postpartum depression. It may be caused by a sudden drop in hormone levels after childbirth. A previous episode of postpartum depression or a family history of depression may increase your risk. Several things may trigger postpartum depression:  Lack of support from the baby's father or other family members    Feeling more tired than usual    Stress, a poor diet, or lack of sleep    Pain after childbirth or pain during breastfeeding    Sudden change in lifestyle  How is postpartum depression diagnosed?  Postpartum depression affects your daily activities and your relationships with other people. Healthcare providers will ask you questions about your signs and symptoms and how they are affecting your life. The symptoms of postpartum depression usually begin within 1 month after childbirth. You feel depressed or lose interest in activities you  enjoy nearly every day for at least 2 weeks. You also have 4 or more of the following symptoms:  You feel tired or have less energy than usual.     You feel unimportant or guilty most of the time.    You think about hurting or killing yourself.    Your appetite changes. You may lose your appetite and lose weight without trying. Your appetite may also increase and you may gain weight.    You are restless, irritable, or withdrawn.    You have trouble concentrating and remembering things. You have trouble doing daily tasks or making decisions.    You have trouble sleeping, even after the baby is asleep.  How is postpartum depression treated?   Psychotherapy:  During therapy, you will talk with healthcare providers about how to cope with your feelings and moods. This can be done alone or in a group. It may also be done with family members or your partner.     Antidepressants:  This medicine is given to decrease or stop the symptoms of depression. You usually need to take antidepressants for several weeks before you begin to feel better. Do not stop taking antidepressants unless your healthcare provider tells you to. Healthcare providers may try a different antidepressant if one type does not work.  What can I do to feel better?   Rest:  Do not try to do everything all at the same time. Do only what is needed and let other things wait until later. Ask your family or friends for help, especially if you have other children. Ask your partner to help with night feedings or other baby care. Try to sleep when the baby naps.     Get emotional support:  Share your feelings with your partner, a friend, or another mother.     Take care of yourself:  Shower and dress each day. Do not skip meals. Try to get out of the house a little each day. Get regular exercise. Eat a healthy diet. Avoid alcohol because it can make your depression worse. Do not isolate yourself. Go for a walk or meet with a friend. It is also important that you  have some time by yourself each day.  How do I find support and more information?   [employer] ([facility]), Public Information & Communication Branch  [address], [address], [address]  [place] [address]   Phone: 0- 645 - [phone]  Phone: 0- 106 - [phone]  Web Address: http://www.[facility].gov/  When should I contact my healthcare provider?   You cannot make it to your next visit.    Your depression does not get better with treatment or it gets worse.     You have questions or concerns about your condition or care.  When should I seek immediate care or call 911?   You think about hurting or killing yourself, your baby, or someone else.    You feel like other people want to hurt you.     You hear voices telling you to hurt yourself or your baby.  CARE AGREEMENT:   You have the right to help plan your care. Learn about your health condition and how it may be treated. Discuss treatment options with your caregivers to decide what care you want to receive. You always have the right to refuse treatment. The above information is an  only. It is not intended as medical advice for individual conditions or treatments. Talk to your doctor, nurse or pharmacist before following any medical regimen to see if it is safe and effective for you.  © 2017 [employer] Information is for End User's use only and may not be sold, redistributed or otherwise used for commercial purposes. All illustrations and images included in CareNotes® are the copyrighted property of [employer]M., Inc. or [employer].      Postpartum Bleeding   WHAT YOU NEED TO KNOW:   Postpartum bleeding is vaginal bleeding after childbirth. This bleeding is normal, whether your baby was born vaginally or by . It contains blood and the tissue that lined the inside of your uterus when you were pregnant.   DISCHARGE INSTRUCTIONS:   What to expect with postpartum bleeding:  Postpartum  bleeding usually lasts at least 10 days, and may last longer than 6 weeks. Your bleeding may range from light (barely staining a pad) to heavy (soaking a pad in 1 hour). Usually, you have heavier bleeding right after childbirth, which slows over the next few weeks until it stops. The bleeding is red or dark brown with clots for the first 1 to 3 days. It then turns pink for several days, and then becomes a white or yellow discharge until it ends.  Follow up with your obstetrician as directed:  Do not have sex until your obstetrician says it is okay. Write down your questions so you remember to ask them during your visits.  Contact your healthcare provider or obstetrician if:   Your bleeding increases, or you have heavy bleeding that soaks a pad in 1 hour for 2 hours in a row.    You pass large blood clots.    You are breathing faster than normal, or your heart is beating faster than normal.    You are urinating less than usual, or not at all.    You feel dizzy.    You have questions or concerns about your condition or care.  Seek immediate care or call 911 if:   You are suddenly short of breath and feel lightheaded.    You have sudden chest pain.  © 2017 [employer] Information is for End User's use only and may not be sold, redistributed or otherwise used for commercial purposes. All illustrations and images included in CareNotes® are the copyrighted property of [employer] or [employer].  The above information is an  only. It is not intended as medical advice for individual conditions or treatments. Talk to your doctor, nurse or pharmacist before following any medical regimen to see if it is safe and effective for you.      Breast Care for the Breast Feeding Mother   WHAT YOU SHOULD KNOW:   Your breasts will go through normal changes while you are breastfeeding. Sometimes breast and nipple problems can develop while you are breastfeeding. Learn about changes that are  normal and those that may be a problem. Breast care can help you prevent and manage problems so you and your baby can enjoy the benefits of breastfeeding.  AFTER YOU LEAVE:   Breast changes while you are breastfeeding:   For the first few days after your baby is born, your body makes a small amount of breast milk (colostrum). Within about 2 to 5 days, your body will begin making mature milk. It may take up to 10 days or longer for mature milk to come in. When your mature milk comes in, your breasts will become full and firm. They may feel tender.     Breastfeeding your baby will decrease the full feeling in your breasts. You may feel a tingly sensation during feedings as milk is released from your breasts. This is called the milk let-down reflex. After 7 or more days, the fullness may feel like it has decreased. Your nipples should look the same as they did before you started breastfeeding. Breasts that feel full before and empty after breastfeeding are signs that breastfeeding is going well.  Breast problems that can occur while you are breastfeeding:   Nipple soreness  may occur when you begin to breastfeed your baby. You may also have nipple soreness if your baby is not latched on to your breast correctly. Correct positioning and latch-on may decrease or stop the pain in your nipples. Work with your caregivers to help your baby latch on correctly. It may also be helpful to place warm, wet compresses on your nipples to help decrease pain.     Plugged milk ducts  may cause painful breast lumps. Plugged ducts may be caused by not emptying your breasts completely during feedings. When your baby pauses during breastfeeding, massage and gently squeeze your breast. Gentle massage may unplug a blocked milk duct. Pump out any milk left in your breasts after your baby is done breastfeeding. Avoid wearing tight tops, tight bras, or under-wire bras, because they may put pressure on your breasts.    Engorgement  may occur as  your milk comes in soon after you begin breastfeeding. Engorgement may cause your breasts to become swollen and painful. Your breasts may also become engorged if you miss a feeding or you do not breastfeed on demand. The best way to decrease engorgement symptoms is to empty your breasts by feeding your baby often. Engorgement can make it hard for your baby to latch on to your breast. If this happens, express a small amount of milk and then have your baby latch on. Cold compresses, gel packs, or ice packs on your breasts can help decrease pain and swelling. Ask your caregiver how often and how long you should use cold, or ice packs.     A breast infection called mastitis  can develop if you have plugged milk ducts or engorgement. Mastitis causes your breasts to become red, swollen, and painful. You may also have flu-like symptoms, such as chills and a fever. Place heat on your breasts to help decrease the pain. You may want to place a moist, warm cloth on the painful breast or both of your breasts. Ask how often to do this. Your primary healthcare provider (PHP) may suggest that you take an NSAID, such as ibuprofen, to decrease pain and swelling. He may also order antibiotics to treat mastitis. Ask about feeding your baby when you have a breast infection.  How to help prevent or manage breast problems while you are breastfeeding:   Learn how to position your baby and latch him on correctly.  To latch your baby correctly to your breast, make sure that his mouth covers most of your areola (dark area around your nipple). He should not be attached only to the nipple. Your baby is latched on well if you feel comfortable and do not feel pain. A correct latch helps him get enough milk and can help to prevent sore nipples and other breast problems. There are several breastfeeding positions that you can try. Find the position that works best for you and your baby. Ask your caregiver for more information about how to hold and  breastfeed your baby.     Prevent biting.  Your baby may get teeth at about 3 to 4 months of age. To help prevent biting, break his suction once he is finished or if he has fallen asleep. To break his suction, slip a finger into the side of his mouth. If your baby bites you, respond with surprise or unhappiness. Offer praise when he does not bite you.     Breastfeed your baby regularly.  Feed your baby 8 to 12 times a day. You may need to wake up your baby at night to feed him. It is okay to feed from 1 or both breasts at each feeding. Your baby should breastfeed from both breasts equally over the course of a day. If your baby only feeds from 1 side during a feeding, offer your other breast to him first for the next feeding.     Schedule and keep follow-up visits.  Talk to your baby's pediatrician or your PHP during follow-up visits if you have breast problems. Caregivers may suggest that you, or you and your partner, attend classes on breastfeeding. You also may want to join a breastfeeding support group. Caregivers may suggest that you see a lactation consultant. This is a caregiver who can help you with breastfeeding.  Contact your PHP if:   You have a fever and chills.    You have body aches and you feel like you do not have any energy.    One or both of your breasts is red, swollen or hard, painful, and feels warm or hot.    You have breast engorgement that does not get better within 24 hours.     You see or feel a lump in your breast that hurts when you touch it.    You have nipple pain during breastfeeding or between feedings.     Your nipples are red, dry, cracked, or bleeding, or they have scabs on them.     You have questions or concerns about your condition or care.  © 2014 [employer] Information is for End User's use only and may not be sold, redistributed or otherwise used for commercial purposes. All illustrations and images included in CareNotes® are the copyrighted property of  [employer] or [employer].  The above information is an  only. It is not intended as medical advice for individual conditions or treatments. Talk to your doctor, nurse or pharmacist before following any medical regimen to see if it is safe and effective for you.

## 2025-07-21 ENCOUNTER — NURSE TRIAGE (OUTPATIENT)
Age: 40
End: 2025-07-21

## 2025-07-21 NOTE — TELEPHONE ENCOUNTER
Regarding: PPAR, swollen feet & hands, tingling sensation  ----- Message from Ann BALLARD sent at 7/21/2025 11:39 AM EDT -----  Patient called- delivered 7/18, discharged w/ concerns of high bp ([facility]).   Pt reports swollen legs & feet, pins & needles sensation & very swollen last night.   Originally requested 1wk bp check Thursday 7/24 after seeing pediatrics however given concerns, may need to be seen sooner  Attempted warm transfer

## 2025-07-21 NOTE — UTILIZATION REVIEW
"NOTIFICATION OF INPATIENT ADMISSION   MATERNITY/DELIVERY AUTHORIZATION REQUEST   SERVICING FACILITY:   [facility]  Parent Child Health - L&D, [place], NICU   [facility] [place] [address]  Tax ID: [id number]  NPI: [id number]   ATTENDING PROVIDER:  Attending Name and NPI#: Malgorzata Estrada Md [[id number]]  Address: [address] [place] [address]  Phone: [phone]   ADMISSION INFORMATION:  Place of Service: Inpatient [facility]  Place of Service Code: 21  Inpatient Admission Date/Time: 25  7:08 AM  Discharge Date/Time: 2025 11:50 AM  Admitting Diagnosis Code/Description:  Amniotic fluid leaking [O42.90]  Encounter for full-term uncomplicated delivery [O80]     Mother: No Daigle 1985 Estimated Date of Delivery: 25  Delivering clinician: Malgorzata Estrada   OB History as of 2025          1    Para   1    Term   1            AB        Living   1         SAB        IAB        Ectopic        Multiple   0    Live Births   1                Name & MRN:   Information for the patient's :  Oliver Daigle [[id number]]    Delivery Information:  Sex: male  Delivered 2025 10:22 AM by Vaginal, Spontaneous; Gestational Age: 40w0d     Measurements:  Weight: 8 lb 3 oz (3714 g);  Height: 20\"    APGAR 1 minute 5 minutes 10 minutes   Totals: 8 9       UTILIZATION REVIEW CONTACT:  Berta Calhoun, Utilization   Network Utilization Review Department  Phone: [phone]  Fax [phone]  Email: [email or web address]  Contact for approvals/pending authorizations, clinical reviews, and discharge.     PHYSICIAN ADVISORY SERVICES:  Medical Necessity Denial & Srid-xr-Zewf Review  Phone: [phone]  Fax: [phone]  Email: [email or web address]     DISCHARGE SUPPORT TEAM:  For Patients Discharge Needs & Updates  Phone: [phone] opt. 2 Fax: [phone]  Email: " [email or web address]

## 2025-07-21 NOTE — TELEPHONE ENCOUNTER
"REASON FOR CONVERSATION: Leg Swelling    SYMPTOMS: bilateral foot and lower leg swelling    OTHER HEALTH INFORMATION: Patient postpartum vaginal delivery 7/18 with bilateral lower leg swelling.  Describes as ongoing since delivery, slightly worsened since discharge. Feeling tingling sensation in feet occasionally. Denies pain, redness, warmth. Swelling is equal on both legs. Denies headache, abdominal pain, vision changes. Has been told to monitor blood pressures at home, ordered a home cuff has not received yet.     PROTOCOL DISPOSITION: No disposition on file.    CARE ADVICE PROVIDED: Continue to monitor at home. Maintain adequate hydration. Elevate legs when at rest. Return call if symptoms worsen, or any headaches, vision changes, abdominal pain occur.    PRACTICE FOLLOW-UP: none          Reason for Disposition   [1] MODERATE leg swelling (e.g., more than just ankles, below knees) AND [2] during postpartum days 1 to 5    Answer Assessment - Initial Assessment Questions  1. ONSET: \"When did the swelling start?\" (e.g., minutes, hours, days)      Ongoing since delivery, slightly worse   2. LOCATION: \"What part of the leg is swollen?\"  \"Are both legs swollen or just one leg?\"      both  3. SEVERITY: \"How bad is the swelling?\" (e.g., localized; mild, moderate, severe)      Goes up to calf, mostly in feet and ankles   4. REDNESS: \"Does the swelling look red or infected?\"      Denies   5. PAIN: \"Is the swelling painful to touch?\" If Yes, ask: \"How painful is it?\"   (Scale 1-10; mild, moderate or severe)      Denies pain, some tingling occasionally that started last night   6. FEVER: \"Do you have a fever?\" If Yes, ask: \"What is it, how was it measured, and when did it start?\"       Denies   7. MEDICAL HISTORY: \"Do you have a history of blood clots, heart failure, kidney disease, liver failure, or preeclampsia?\"      Had elevated blood pressures at end of pregnancy and through admission   8. OTHER SYMPTOMS: \"Do you have " Dr. Grant Lion at bedside     Carol Ochoa, [address]  11/25/23 1459 "any other symptoms?\" (e.g., chest pain, difficulty breathing)      Denies   9. DELIVERY DATE: \"When was your delivery date?\" \"Vaginal delivery or ?\"       vaginal    Protocols used: Postpartum - Leg Swelling and Edema-Adult-AH    "

## 2025-07-23 ENCOUNTER — NURSE TRIAGE (OUTPATIENT)
Age: 40
End: 2025-07-23

## 2025-07-23 NOTE — TELEPHONE ENCOUNTER
"REASON FOR CONVERSATION: Vaginal Pain    SYMPTOMS: vaginal/perineum pain    OTHER HEALTH INFORMATION: Patient postpartum vaginal delivery 7/18 with 2nd degree laceration. Reports falling yesterday and landing hard on her bottom, and pain in vagina and perineum has been worsening. Looked at area with mirror and it looks like part of the stitching is open. Currently 8-9/10 with sitting, movement, and coughing. Has been taking tylenol prn for pain, using flaquito bottle, dermoplast, hydrocortisone cream and tucks pads without relief.     PROTOCOL DISPOSITION: See PCP Within 24 Hours    CARE ADVICE PROVIDED: Reviewed perineal care. Thoroughly wash perineum with flaquito bottle, pat dry. Use dermoplast, hydrocortisone, and tucks pads to painful areas. May use sitz bath for 10-20 minutes three times daily. May also try using a donut or waffle cushion to support bottom while sitting.     PRACTICE FOLLOW-UP: Needs office visit for exam.            Reason for Disposition   [1] Perineum pain (area between vagina and anus) is getting WORSE AND [2] > 48 hours since delivery    Answer Assessment - Initial Assessment Questions  1. SYMPTOM: \"What's the main symptom you're concerned about?\" (e.g., pain, stitch tightness, swelling)      Pain, burning   2. ONSET: \"When did the  symptoms  start?\"      Fell yesterday and landed hard and feels like something is wrong   3. APPEARANCE OF PERINEUM: \"How does the episiotomy or wound look?\" (e.g., abnormal discharge, broken stitch, gaping area or opening of incision, lump)      Looked with mirror and it looks like a stitch is open ; reports 2nd degree laceration   4. DELIVERY DATE: \"When was your delivery date?\"      7/18/25 vaginal   5. PAIN: \"Is there any pain?\" If Yes, ask: \"How bad is it?\" (Scale: 1-10; mild, moderate, severe)      8/10  6. FEVER: \"Do you have a fever?\" If Yes, ask: \"What is your temperature, how was it measured, and when did it start?\"      Denies   7. OTHER SYMPTOMS: \"Do you " "have any other symptoms?\" (e.g., abdomen pain, vaginal discharge, pain with urination)      Had some burning with urination yesterday but not before or since ; vaginal bleeding has been minimal    Protocols used: Postpartum - Episiotomy or Vaginal Laceration Symptoms-Adult-AH    "

## 2025-07-23 NOTE — TELEPHONE ENCOUNTER
Offered patient an appointment for Friday with Dr Gibbs but unable to make it , I informed her I will keep and eye on the schedule for tomorrow or Monday to be seen by a provider. ELLIOT KAPLAN

## 2025-07-23 NOTE — TELEPHONE ENCOUNTER
Pt returned call, she is unable to do appt @ 4 PM. Pt asked if she could be evaluated on Monday in [place] since she has to come in office for BP check on the same day. Please return call to pt.

## 2025-07-24 ENCOUNTER — TELEPHONE (OUTPATIENT)
Dept: OBGYN CLINIC | Facility: CLINIC | Age: 40
End: 2025-07-24

## 2025-07-24 LAB — PLACENTA IN STORAGE: NORMAL

## 2025-07-24 NOTE — TELEPHONE ENCOUNTER
LMOM for pt to confirm appt for vaginal check & bp check scheduled for 7/28 at 1:30 in [place] with masood. Minterat message also sent to pt as well for confirmation. Appt needs to be for 30 mins with provider.

## 2025-07-24 NOTE — TELEPHONE ENCOUNTER
Pt returned call, to confirm appt for Monday 7/28/25 with Irma at 1pm. Patient wanted me to inform , that her swelling in her legs and feet has gone down significantly.     *also I canceled the appt for tomorrow 7/25/25, as patient states she is not able to make that*

## 2025-07-25 ENCOUNTER — NURSE TRIAGE (OUTPATIENT)
Age: 40
End: 2025-07-25

## 2025-07-25 DIAGNOSIS — R39.15 URGENCY OF URINATION: Primary | ICD-10-CM

## 2025-07-25 NOTE — TELEPHONE ENCOUNTER
"REASON FOR CONVERSATION: Urinary Symptoms    SYMPTOMS: Urinary symptoms started yesterday with burning on urination, feels urgency and frequency. Urine is not cloudy, no odor. Vaginal bleeding post partum, not soaking pads. No vaginal discharge. No fever, no chills, no back pain.  Patient has appointment , triaged  for concern w/ vaginal healing. States she is using flaquito flush bottle, cushion and all the previous recommendations.   We discussed labs versus PCP/ UC appointment. Patient unable to come for sooner appointment due to her schedule today.    OTHER HEALTH INFORMATION:  26 , breastfeeding     PROTOCOL DISPOSITION: labs ordered per protocol.    CARE ADVICE PROVIDED: reviewed urine tests are 2 parts. The urinalysis comes back pretty quickly the culture part can take 24- 48 hrs because it checks what bacteria grows and then determine if there is infection, and suggests which medication is best to treat the bacteria.   Please increase oral fluids as much as possible, lemon water is helpful.    If blood noted in urine, severe pain, fever or you are unable to void please call office as soon as possible or seek ED/ UC. Patient verbalzied understanding       PRACTICE FOLLOW-UP: HP to Dr. Estrada for review      Reason for Disposition   Urinating more frequently than usual (i.e., frequency) OR new-onset of the feeling of an urgent need to urinate (i.e., urgency)    Answer Assessment - Initial Assessment Questions  1. SYMPTOM: \"What's the main symptom you're concerned about?\" (e.g., frequency, incontinence)      Burning with urination, urge to constantly pee  2. ONSET: \"When did the  s/sx  start?\"      yesterday  3. PAIN: \"Is there any pain?\" If Yes, ask: \"How bad is it?\" (Scale: 1-10; mild, moderate, severe)      Burning sensation   4. CAUSE: \"What do you think is causing the symptoms?\"      Possible UTI   5. OTHER SYMPTOMS: \"Do you have any other symptoms?\" (e.g., blood in urine, fever, " "flank pain, pain with urination)      No fever, no chills   6. PREGNANCY: \"Is there any chance you are pregnant?\" \"When was your last menstrual period?\"        25    Protocols used: Urinary Symptoms-Adult-[place]    "

## 2025-07-25 NOTE — TELEPHONE ENCOUNTER
(Malgorzata Estrada MD- Have her complete UA today- if looks like infection will send. Prefer with breast feeding to not treat empirically given it can cause some gas and diarrhea for baby but if needed will send).     Left vmm to call office.  [employer] msg sent with providers note and recommendations.

## 2025-07-25 NOTE — TELEPHONE ENCOUNTER
Attempted to call patient to discuss symptoms in further detail. Left a non-detailed voicemail to call the office back at [phone].

## 2025-07-26 DIAGNOSIS — E03.8 SUBCLINICAL HYPOTHYROIDISM: ICD-10-CM

## 2025-07-28 ENCOUNTER — TELEPHONE (OUTPATIENT)
Age: 40
End: 2025-07-28

## 2025-07-28 ENCOUNTER — OFFICE VISIT (OUTPATIENT)
Dept: OBGYN CLINIC | Facility: CLINIC | Age: 40
End: 2025-07-28
Payer: COMMERCIAL

## 2025-07-28 VITALS — WEIGHT: 192 LBS | BODY MASS INDEX: 29.19 KG/M2 | DIASTOLIC BLOOD PRESSURE: 80 MMHG | SYSTOLIC BLOOD PRESSURE: 120 MMHG

## 2025-07-28 DIAGNOSIS — E03.8 SUBCLINICAL HYPOTHYROIDISM: Primary | ICD-10-CM

## 2025-07-28 DIAGNOSIS — R39.9 LOWER URINARY TRACT SYMPTOMS (LUTS): ICD-10-CM

## 2025-07-28 DIAGNOSIS — E03.8 SUBCLINICAL HYPOTHYROIDISM: ICD-10-CM

## 2025-07-28 LAB
SL AMB  POCT GLUCOSE, UA: ABNORMAL
SL AMB LEUKOCYTE ESTERASE,UA: 15
SL AMB POCT BILIRUBIN,UA: ABNORMAL
SL AMB POCT BLOOD,UA: 3
SL AMB POCT CLARITY,UA: CLEAR
SL AMB POCT COLOR,UA: YELLOW
SL AMB POCT KETONES,UA: ABNORMAL
SL AMB POCT NITRITE,UA: ABNORMAL
SL AMB POCT PH,UA: 5.5
SL AMB POCT SPECIFIC GRAVITY,UA: 1.25
SL AMB POCT URINE PROTEIN: ABNORMAL
SL AMB POCT UROBILINOGEN: 0.2

## 2025-07-28 PROCEDURE — PNV: Performed by: NURSE PRACTITIONER

## 2025-07-28 PROCEDURE — 81002 URINALYSIS NONAUTO W/O SCOPE: CPT | Performed by: NURSE PRACTITIONER

## 2025-07-28 RX ORDER — LEVOTHYROXINE SODIUM 25 UG/1
25 TABLET ORAL DAILY
Qty: 30 TABLET | Refills: 0 | Status: SHIPPED | OUTPATIENT
Start: 2025-07-28

## 2025-07-28 RX ORDER — CEPHALEXIN 500 MG/1
500 CAPSULE ORAL EVERY 8 HOURS SCHEDULED
Qty: 21 CAPSULE | Refills: 0 | Status: SHIPPED | OUTPATIENT
Start: 2025-07-28 | End: 2025-08-04

## 2025-07-28 RX ORDER — LEVOTHYROXINE SODIUM 25 UG/1
25 TABLET ORAL DAILY
Qty: 30 TABLET | Refills: 2 | Status: CANCELLED | OUTPATIENT
Start: 2025-07-28

## 2025-07-29 ENCOUNTER — NURSE TRIAGE (OUTPATIENT)
Age: 40
End: 2025-07-29

## 2025-07-29 DIAGNOSIS — R39.9 LOWER URINARY TRACT SYMPTOMS (LUTS): Primary | ICD-10-CM

## 2025-07-29 RX ORDER — NITROFURANTOIN 25; 75 MG/1; MG/1
100 CAPSULE ORAL 2 TIMES DAILY
Qty: 14 CAPSULE | Refills: 0 | Status: SHIPPED | OUTPATIENT
Start: 2025-07-29 | End: 2025-08-05

## 2025-07-29 RX ORDER — LEVOTHYROXINE SODIUM 25 UG/1
25 TABLET ORAL DAILY
Qty: 30 TABLET | Refills: 0 | OUTPATIENT
Start: 2025-07-29

## 2025-07-30 ENCOUNTER — RESULTS FOLLOW-UP (OUTPATIENT)
Dept: OBGYN CLINIC | Facility: CLINIC | Age: 40
End: 2025-07-30

## 2025-07-30 DIAGNOSIS — N89.8 ITCHING IN THE VAGINAL AREA: Primary | ICD-10-CM

## 2025-07-30 LAB
BACTERIA UR CULT: NORMAL
Lab: NORMAL

## 2025-07-30 RX ORDER — FLUCONAZOLE 100 MG/1
100 TABLET ORAL
Qty: 2 TABLET | Refills: 0 | Status: SHIPPED | OUTPATIENT
Start: 2025-07-30 | End: 2025-08-03

## 2025-08-01 ENCOUNTER — OFFICE VISIT (OUTPATIENT)
Dept: POSTPARTUM | Facility: CLINIC | Age: 40
End: 2025-08-01
Payer: COMMERCIAL

## 2025-08-01 PROCEDURE — 99404 PREV MED CNSL INDIV APPRX 60: CPT | Performed by: PEDIATRICS

## 2025-08-01 RX ORDER — DIAPER,BRIEF,ADULT, DISPOSABLE
1 EACH MISCELLANEOUS 2 TIMES DAILY
COMMUNITY

## 2025-08-15 ENCOUNTER — OFFICE VISIT (OUTPATIENT)
Dept: POSTPARTUM | Facility: CLINIC | Age: 40
End: 2025-08-15
Payer: COMMERCIAL

## 2025-08-22 DIAGNOSIS — E03.8 SUBCLINICAL HYPOTHYROIDISM: ICD-10-CM

## 2025-08-22 RX ORDER — LEVOTHYROXINE SODIUM 25 UG/1
25 TABLET ORAL DAILY
Qty: 30 TABLET | Refills: 0 | Status: SHIPPED | OUTPATIENT
Start: 2025-08-22 | End: 2025-08-25 | Stop reason: SDUPTHER

## 2025-08-25 PROBLEM — E03.9 HYPOTHYROIDISM DURING PREGNANCY IN FIRST TRIMESTER: Status: RESOLVED | Noted: 2024-11-23 | Resolved: 2025-08-25

## 2025-08-25 PROBLEM — O36.8190 DECREASED FETAL MOVEMENT AFFECTING MANAGEMENT OF MOTHER, ANTEPARTUM: Status: RESOLVED | Noted: 2025-06-26 | Resolved: 2025-08-25

## 2025-08-25 PROBLEM — O99.281 HYPOTHYROIDISM DURING PREGNANCY IN FIRST TRIMESTER: Status: RESOLVED | Noted: 2024-11-23 | Resolved: 2025-08-25

## 2025-08-25 PROBLEM — O13.9 GESTATIONAL HTN: Status: RESOLVED | Noted: 2025-07-17 | Resolved: 2025-08-25
